# Patient Record
Sex: FEMALE | Race: WHITE | Employment: UNEMPLOYED | URBAN - METROPOLITAN AREA
[De-identification: names, ages, dates, MRNs, and addresses within clinical notes are randomized per-mention and may not be internally consistent; named-entity substitution may affect disease eponyms.]

---

## 2017-09-02 ENCOUNTER — APPOINTMENT (OUTPATIENT)
Dept: CT IMAGING | Facility: HOSPITAL | Age: 62
DRG: 039 | End: 2017-09-02
Attending: EMERGENCY MEDICINE
Payer: MEDICAID

## 2017-09-02 ENCOUNTER — APPOINTMENT (OUTPATIENT)
Dept: MRI IMAGING | Facility: HOSPITAL | Age: 62
DRG: 039 | End: 2017-09-02
Attending: Other
Payer: MEDICAID

## 2017-09-02 ENCOUNTER — APPOINTMENT (OUTPATIENT)
Dept: GENERAL RADIOLOGY | Facility: HOSPITAL | Age: 62
DRG: 039 | End: 2017-09-02
Attending: EMERGENCY MEDICINE
Payer: MEDICAID

## 2017-09-02 ENCOUNTER — HOSPITAL ENCOUNTER (INPATIENT)
Facility: HOSPITAL | Age: 62
LOS: 6 days | Discharge: HOME | DRG: 039 | End: 2017-09-08
Attending: EMERGENCY MEDICINE | Admitting: SURGERY
Payer: MEDICAID

## 2017-09-02 DIAGNOSIS — R53.1 ACUTE LEFT-SIDED WEAKNESS: Primary | ICD-10-CM

## 2017-09-02 DIAGNOSIS — I77.9 RIGHT-SIDED CAROTID ARTERY DISEASE (HCC): ICD-10-CM

## 2017-09-02 DIAGNOSIS — R47.1 DYSARTHRIA: ICD-10-CM

## 2017-09-02 PROBLEM — R73.9 HYPERGLYCEMIA: Status: ACTIVE | Noted: 2017-09-02

## 2017-09-02 LAB
ALBUMIN SERPL-MCNC: 2.8 G/DL (ref 3.5–4.8)
ALP LIVER SERPL-CCNC: 96 U/L (ref 50–130)
ALT SERPL-CCNC: 21 U/L (ref 14–54)
APTT PPP: 25 SECONDS (ref 25–34)
AST SERPL-CCNC: 20 U/L (ref 15–41)
ATRIAL RATE: 76 BPM
BASOPHILS # BLD AUTO: 0.1 X10(3) UL (ref 0–0.1)
BASOPHILS NFR BLD AUTO: 0.8 %
BILIRUB SERPL-MCNC: 0.5 MG/DL (ref 0.1–2)
BUN BLD-MCNC: 8 MG/DL (ref 8–20)
CALCIUM BLD-MCNC: 9.8 MG/DL (ref 8.3–10.3)
CHLORIDE: 108 MMOL/L (ref 101–111)
CHOLEST SMN-MCNC: 149 MG/DL (ref ?–200)
CO2: 25 MMOL/L (ref 22–32)
CREAT BLD-MCNC: 0.68 MG/DL (ref 0.55–1.02)
EOSINOPHIL # BLD AUTO: 0.17 X10(3) UL (ref 0–0.3)
EOSINOPHIL NFR BLD AUTO: 1.4 %
ERYTHROCYTE [DISTWIDTH] IN BLOOD BY AUTOMATED COUNT: 13.3 % (ref 11.5–16)
EST. AVERAGE GLUCOSE BLD GHB EST-MCNC: 128 MG/DL (ref 68–126)
GLUCOSE BLD-MCNC: 101 MG/DL (ref 65–99)
GLUCOSE BLD-MCNC: 120 MG/DL (ref 70–99)
GLUCOSE BLD-MCNC: 135 MG/DL (ref 65–99)
GLUCOSE BLD-MCNC: 94 MG/DL (ref 65–99)
HBA1C MFR BLD HPLC: 6.1 % (ref ?–5.7)
HCT VFR BLD AUTO: 46.5 % (ref 34–50)
HDLC SERPL-MCNC: 31 MG/DL (ref 45–?)
HDLC SERPL: 4.81 {RATIO} (ref ?–4.44)
HGB BLD-MCNC: 15.8 G/DL (ref 12–16)
IMMATURE GRANULOCYTE COUNT: 0.15 X10(3) UL (ref 0–1)
IMMATURE GRANULOCYTE RATIO %: 1.2 %
INR BLD: 0.96 (ref 0.89–1.11)
INR BLD: 0.99 (ref 0.89–1.11)
LDLC SERPL CALC-MCNC: 89 MG/DL (ref ?–130)
LDLC SERPL-MCNC: 29 MG/DL (ref 5–40)
LYMPHOCYTES # BLD AUTO: 2.68 X10(3) UL (ref 0.9–4)
LYMPHOCYTES NFR BLD AUTO: 21.8 %
M PROTEIN MFR SERPL ELPH: 7.3 G/DL (ref 6.1–8.3)
MCH RBC QN AUTO: 30.5 PG (ref 27–33.2)
MCHC RBC AUTO-ENTMCNC: 34 G/DL (ref 31–37)
MCV RBC AUTO: 89.8 FL (ref 81–100)
MONOCYTES # BLD AUTO: 0.63 X10(3) UL (ref 0.1–0.6)
MONOCYTES NFR BLD AUTO: 5.1 %
MORPHOLOGY: NORMAL
NEUTROPHIL ABS PRELIM: 8.59 X10 (3) UL (ref 1.3–6.7)
NEUTROPHILS # BLD AUTO: 8.59 X10(3) UL (ref 1.3–6.7)
NEUTROPHILS NFR BLD AUTO: 69.7 %
NONHDLC SERPL-MCNC: 118 MG/DL (ref ?–130)
P AXIS: 67 DEGREES
P-R INTERVAL: 164 MS
PLATELET # BLD AUTO: 360 10(3)UL (ref 150–450)
PLATELET MORPHOLOGY: NORMAL
POTASSIUM SERPL-SCNC: 4.8 MMOL/L (ref 3.6–5.1)
PSA SERPL DL<=0.01 NG/ML-MCNC: 12.8 SECONDS (ref 12–14.3)
PSA SERPL DL<=0.01 NG/ML-MCNC: 13.1 SECONDS (ref 12–14.3)
Q-T INTERVAL: 374 MS
QRS DURATION: 98 MS
QTC CALCULATION (BEZET): 420 MS
R AXIS: 64 DEGREES
RBC # BLD AUTO: 5.18 X10(6)UL (ref 3.8–5.1)
RED CELL DISTRIBUTION WIDTH-SD: 44.1 FL (ref 35.1–46.3)
SODIUM SERPL-SCNC: 140 MMOL/L (ref 136–144)
T AXIS: 71 DEGREES
TRIGLYCERIDES: 143 MG/DL (ref ?–150)
TROPONIN: <0.046 NG/ML (ref ?–0.05)
VENTRICULAR RATE: 76 BPM
WBC # BLD AUTO: 12.3 X10(3) UL (ref 4–13)

## 2017-09-02 PROCEDURE — 70450 CT HEAD/BRAIN W/O DYE: CPT | Performed by: EMERGENCY MEDICINE

## 2017-09-02 PROCEDURE — 70551 MRI BRAIN STEM W/O DYE: CPT | Performed by: OTHER

## 2017-09-02 PROCEDURE — 71010 XR CHEST AP PORTABLE  (CPT=71010): CPT | Performed by: EMERGENCY MEDICINE

## 2017-09-02 PROCEDURE — 99223 1ST HOSP IP/OBS HIGH 75: CPT | Performed by: HOSPITALIST

## 2017-09-02 PROCEDURE — 70547 MR ANGIOGRAPHY NECK W/O DYE: CPT | Performed by: OTHER

## 2017-09-02 PROCEDURE — 99223 1ST HOSP IP/OBS HIGH 75: CPT | Performed by: OTHER

## 2017-09-02 PROCEDURE — 70544 MR ANGIOGRAPHY HEAD W/O DYE: CPT | Performed by: OTHER

## 2017-09-02 RX ORDER — SODIUM PHOSPHATE, DIBASIC AND SODIUM PHOSPHATE, MONOBASIC 7; 19 G/133ML; G/133ML
1 ENEMA RECTAL ONCE AS NEEDED
Status: DISCONTINUED | OUTPATIENT
Start: 2017-09-02 | End: 2017-09-02

## 2017-09-02 RX ORDER — SODIUM PHOSPHATE, DIBASIC AND SODIUM PHOSPHATE, MONOBASIC 7; 19 G/133ML; G/133ML
1 ENEMA RECTAL ONCE AS NEEDED
Status: DISCONTINUED | OUTPATIENT
Start: 2017-09-02 | End: 2017-09-08

## 2017-09-02 RX ORDER — ASPIRIN 300 MG
300 SUPPOSITORY, RECTAL RECTAL DAILY
Status: DISCONTINUED | OUTPATIENT
Start: 2017-09-02 | End: 2017-09-05

## 2017-09-02 RX ORDER — DOCUSATE SODIUM 100 MG/1
100 CAPSULE, LIQUID FILLED ORAL 2 TIMES DAILY
Status: DISCONTINUED | OUTPATIENT
Start: 2017-09-02 | End: 2017-09-03

## 2017-09-02 RX ORDER — BISACODYL 10 MG
10 SUPPOSITORY, RECTAL RECTAL
Status: DISCONTINUED | OUTPATIENT
Start: 2017-09-02 | End: 2017-09-02

## 2017-09-02 RX ORDER — FLUTICASONE PROPIONATE 50 MCG
1 SPRAY, SUSPENSION (ML) NASAL 2 TIMES DAILY
Status: DISCONTINUED | OUTPATIENT
Start: 2017-09-02 | End: 2017-09-05

## 2017-09-02 RX ORDER — TEMAZEPAM 7.5 MG/1
7.5 CAPSULE ORAL NIGHTLY PRN
Status: DISCONTINUED | OUTPATIENT
Start: 2017-09-02 | End: 2017-09-08

## 2017-09-02 RX ORDER — POLYETHYLENE GLYCOL 3350 17 G/17G
17 POWDER, FOR SOLUTION ORAL DAILY PRN
Status: DISCONTINUED | OUTPATIENT
Start: 2017-09-02 | End: 2017-09-08

## 2017-09-02 RX ORDER — FAMOTIDINE 10 MG/ML
20 INJECTION, SOLUTION INTRAVENOUS 2 TIMES DAILY
Status: DISCONTINUED | OUTPATIENT
Start: 2017-09-02 | End: 2017-09-03

## 2017-09-02 RX ORDER — POLYETHYLENE GLYCOL 3350 17 G/17G
17 POWDER, FOR SOLUTION ORAL DAILY PRN
Status: DISCONTINUED | OUTPATIENT
Start: 2017-09-02 | End: 2017-09-02

## 2017-09-02 RX ORDER — ACETAMINOPHEN 325 MG/1
650 TABLET ORAL EVERY 4 HOURS PRN
Status: DISCONTINUED | OUTPATIENT
Start: 2017-09-02 | End: 2017-09-02

## 2017-09-02 RX ORDER — ASPIRIN 81 MG/1
324 TABLET, CHEWABLE ORAL ONCE
Status: COMPLETED | OUTPATIENT
Start: 2017-09-02 | End: 2017-09-02

## 2017-09-02 RX ORDER — MORPHINE SULFATE 4 MG/ML
1 INJECTION, SOLUTION INTRAMUSCULAR; INTRAVENOUS EVERY 2 HOUR PRN
Status: DISCONTINUED | OUTPATIENT
Start: 2017-09-02 | End: 2017-09-06

## 2017-09-02 RX ORDER — FAMOTIDINE 20 MG/1
20 TABLET ORAL 2 TIMES DAILY
Status: DISCONTINUED | OUTPATIENT
Start: 2017-09-02 | End: 2017-09-03

## 2017-09-02 RX ORDER — LABETALOL HYDROCHLORIDE 5 MG/ML
10 INJECTION, SOLUTION INTRAVENOUS EVERY 10 MIN PRN
Status: DISCONTINUED | OUTPATIENT
Start: 2017-09-02 | End: 2017-09-02

## 2017-09-02 RX ORDER — METOCLOPRAMIDE HYDROCHLORIDE 5 MG/ML
10 INJECTION INTRAMUSCULAR; INTRAVENOUS EVERY 8 HOURS PRN
Status: DISCONTINUED | OUTPATIENT
Start: 2017-09-02 | End: 2017-09-08

## 2017-09-02 RX ORDER — SENNOSIDES 8.6 MG
17.2 TABLET ORAL NIGHTLY
Status: DISCONTINUED | OUTPATIENT
Start: 2017-09-02 | End: 2017-09-03

## 2017-09-02 RX ORDER — MORPHINE SULFATE 4 MG/ML
2 INJECTION, SOLUTION INTRAMUSCULAR; INTRAVENOUS EVERY 2 HOUR PRN
Status: DISCONTINUED | OUTPATIENT
Start: 2017-09-02 | End: 2017-09-02

## 2017-09-02 RX ORDER — BISACODYL 10 MG
10 SUPPOSITORY, RECTAL RECTAL
Status: DISCONTINUED | OUTPATIENT
Start: 2017-09-02 | End: 2017-09-08

## 2017-09-02 RX ORDER — MORPHINE SULFATE 4 MG/ML
1 INJECTION, SOLUTION INTRAMUSCULAR; INTRAVENOUS EVERY 2 HOUR PRN
Status: DISCONTINUED | OUTPATIENT
Start: 2017-09-02 | End: 2017-09-02

## 2017-09-02 RX ORDER — MORPHINE SULFATE 4 MG/ML
2 INJECTION, SOLUTION INTRAMUSCULAR; INTRAVENOUS EVERY 2 HOUR PRN
Status: DISCONTINUED | OUTPATIENT
Start: 2017-09-02 | End: 2017-09-06

## 2017-09-02 RX ORDER — ACETAMINOPHEN 650 MG/1
650 SUPPOSITORY RECTAL EVERY 4 HOURS PRN
Status: DISCONTINUED | OUTPATIENT
Start: 2017-09-02 | End: 2017-09-02

## 2017-09-02 RX ORDER — DOCUSATE SODIUM 100 MG/1
100 CAPSULE, LIQUID FILLED ORAL 2 TIMES DAILY
Status: DISCONTINUED | OUTPATIENT
Start: 2017-09-02 | End: 2017-09-02

## 2017-09-02 RX ORDER — ATORVASTATIN CALCIUM 80 MG/1
80 TABLET, FILM COATED ORAL NIGHTLY
Status: DISCONTINUED | OUTPATIENT
Start: 2017-09-02 | End: 2017-09-05

## 2017-09-02 RX ORDER — METOCLOPRAMIDE HYDROCHLORIDE 5 MG/ML
10 INJECTION INTRAMUSCULAR; INTRAVENOUS EVERY 8 HOURS PRN
Status: DISCONTINUED | OUTPATIENT
Start: 2017-09-02 | End: 2017-09-02

## 2017-09-02 RX ORDER — SODIUM CHLORIDE 9 MG/ML
INJECTION, SOLUTION INTRAVENOUS CONTINUOUS
Status: ACTIVE | OUTPATIENT
Start: 2017-09-02 | End: 2017-09-04

## 2017-09-02 RX ORDER — ASPIRIN 325 MG
325 TABLET ORAL DAILY
Status: DISCONTINUED | OUTPATIENT
Start: 2017-09-02 | End: 2017-09-05

## 2017-09-02 RX ORDER — ACETAMINOPHEN 650 MG/1
650 SUPPOSITORY RECTAL EVERY 4 HOURS PRN
Status: DISCONTINUED | OUTPATIENT
Start: 2017-09-02 | End: 2017-09-08

## 2017-09-02 RX ORDER — ACETAMINOPHEN 325 MG/1
650 TABLET ORAL EVERY 4 HOURS PRN
Status: DISCONTINUED | OUTPATIENT
Start: 2017-09-02 | End: 2017-09-08

## 2017-09-02 RX ORDER — LABETALOL HYDROCHLORIDE 5 MG/ML
10 INJECTION, SOLUTION INTRAVENOUS EVERY 10 MIN PRN
Status: DISCONTINUED | OUTPATIENT
Start: 2017-09-02 | End: 2017-09-08

## 2017-09-02 RX ORDER — ONDANSETRON 2 MG/ML
4 INJECTION INTRAMUSCULAR; INTRAVENOUS EVERY 6 HOURS PRN
Status: DISCONTINUED | OUTPATIENT
Start: 2017-09-02 | End: 2017-09-02

## 2017-09-02 RX ORDER — ONDANSETRON 2 MG/ML
4 INJECTION INTRAMUSCULAR; INTRAVENOUS EVERY 6 HOURS PRN
Status: DISCONTINUED | OUTPATIENT
Start: 2017-09-02 | End: 2017-09-05

## 2017-09-02 RX ORDER — CODEINE PHOSPHATE AND GUAIFENESIN 10; 100 MG/5ML; MG/5ML
5 SOLUTION ORAL EVERY 4 HOURS PRN
Status: DISCONTINUED | OUTPATIENT
Start: 2017-09-02 | End: 2017-09-08

## 2017-09-02 RX ORDER — PHENYLEPHRINE HCL IN 0.9% NACL 50MG/250ML
PLASTIC BAG, INJECTION (ML) INTRAVENOUS CONTINUOUS PRN
Status: DISCONTINUED | OUTPATIENT
Start: 2017-09-02 | End: 2017-09-08

## 2017-09-02 NOTE — PROGRESS NOTES
CODE STROKE NOTE:    Responded to Code Stroke in ED, A9, paged at 10:41. Arrived to patient's bedside at 843.    57year old female presents to ED with c/o of tremors to Left arm and speech difficulties and HA. Onset time at 07:30.     NIHSS reporte

## 2017-09-02 NOTE — ED NOTES
Report to [facility] paged  Pt updated on room number and eta to floor. Pt denies any needs prior to transport. Assessment unchanged since last rounding.

## 2017-09-02 NOTE — SLP NOTE
ADULT SWALLOWING EVALUATION    ASSESSMENT & PLAN   ASSESSMENT  Pt seen for BSSE per stroke protocol. Per the pt, the pt has been having difficulty decoding word when reading. She stated that this has been happening for more than a few weeks.  The pt stated clear.  There are no pleural effusions.  The bones are unremarkable.        Impression     CONCLUSION:  No acute disease.           OBJECTIVE   ORAL MOTOR EXAMINATION  Dentition: Functional  Symmetry: Within Functional Limits  Strength:  Within Functional L

## 2017-09-02 NOTE — ED NOTES
Pt back in room. Stroke coordinator at bedside. Speech remains delayed. Pt c/o mild headache. Sts home life has been very stressful. No involuntary movements to extremities noted.      Ready for XR

## 2017-09-02 NOTE — ED PROVIDER NOTES
Patient Seen in: [facility] Emergency Department    History   Patient presents with:  Stroke (neurologic)    Stated Complaint: Stroke symptoms    HPI    Patient presents with acute weakness and difficulties speaking.   She initially noted her cyst sy LUMBAR / TRANSFORAMINAL EPIDURAL                STEROID INJECTION;  Surgeon: Popeye Ambrose DO;  Location: [address] [place] [address]  No date: OTHER      Comment: FINGER AMPUTATION--ACCIDENTAL  No date: REMOVAL GALLBLADDER    Medications : Cardiovascular: Normal rate, regular rhythm, normal heart sounds and intact distal pulses. No murmur heard. Pulmonary/Chest: Effort normal and breath sounds normal. No respiratory distress. Abdominal: Soft.  Bowel sounds are normal. There is no tend DIFFERENTIAL[[id number]]          Abnormal            Final result                 Please view results for these tests on the individual orders.    SCAN SLIDE   URINALYSIS WITH CULTURE REFLEX   RAINBOW DRAW BLUE   RAINBOW DRAW GOLD   RAINBOW DRAW LAVENDER

## 2017-09-02 NOTE — ED NOTES
Round on pt. Speech improving. Delayed responses are more timely. Pt sts she feels her speech is better as well.

## 2017-09-02 NOTE — CONSULTS
[facility]  Report of Consultation    Tita Dowling Patient Status:  Inpatient    3/2/1955 MRN [id number]   [facility] 7NE-A Attending Antoine Krishnamurthy MD   Hosp Day # 0 PCP Dieudonne Caldwell     Reason for Consultation:      History of P blood pressure    • Impaired fasting blood sugar      Past Surgical History:  No date: CHOLECYSTECTOMY      Comment: 10/1/11, ERCP with stone extraction  11/10/2015: COLONOSCOPY,BIOPSY N/A      Comment: Procedure: COLONOSCOPY, POSSIBLE BIOPSY, 2 drops q2 hours x 24 hours then bid x 6 days Disp: 5 mL Rfl: 0 Unknown at Unknown time   HYDROcodone-acetaminophen (NORCO) 5-325 MG Oral Tab Take 1 tablet by mouth every 6 (six) hours as needed for Pain.  Disp:  Rfl:  Unknown at Unknown time         Jess 143/60 98.7 °F (37.1 °C) Oral 71 18 97 % - -   09/02/17 1211 146/94 - - 77 16 97 % - -   09/02/17 1135 112/52 - - 85 16 97 % - -   09/02/17 1104 152/70 - - 81 16 97 % - -   09/02/17 1035 (!) 162/70 98.6 °F (37 °C) Temporal 79 18 96 % 69\" 195 lb       Gen: Imaging:  Xr Chest Ap Portable  (cpt=71010)    Result Date: 9/2/2017  CONCLUSION:  No acute disease.      Dictated by: Ciera Hernandez MD on 9/02/2017 at 11:21     Approved by: Ciera Hernandez MD            Ct Stroke Brain (no Iv)(cpt=70450)

## 2017-09-02 NOTE — ED INITIAL ASSESSMENT (HPI)
Pt to ED with c/o involuntary movements and the left side with delayed speech that started at 0730. Speech deficit remains, No involuntary movements noted.

## 2017-09-02 NOTE — PLAN OF CARE
Received from ER awake alert flat affect, somewhat withdrawn caudal headache 4-5/10 constant. States she has been combating a cold for about one week, with significant coughing at bedtime.   She became agitated with the depression screening and did not wan

## 2017-09-02 NOTE — PROGRESS NOTES
09/02/17 1706   Clinical Encounter Type   Visited With Patient   Routine Visit Introduction   Continue Visiting No   Patient's Supportive Strategies/Resources  provided emotional support, including active listening and acknowledgment of concerns

## 2017-09-02 NOTE — H&P
[facility]  History and Physical     Mark Espana Patient Status:  Inpatient    3/2/1955 MRN [id number]   [facility] 7NE-A Attending Manfred Gottron, MD   Hosp Day # 0 PCP Lida Garcia     Chief Complaint: L side weakness    Hi Location: [address] [place] [address]  11/19/2015: INJECTION, W/WO CONTRAST, DX/THERAPEUTIC SUBST* N/A      Comment: Procedure: LUMBAR / TRANSFORAMINAL EPIDURAL                STEROID INJECTION;  Surgeon: Cottie Nissen, DO;  Location: [facility]. Respiratory: Clear to auscultation bilaterally. No wheezes. No rhonchi. Cardiovascular: S1, S2. Regular rate and rhythm. No murmurs, rubs or gallops. Equal pulses. Chest and Back: No tenderness or deformity.   Abdomen: Soft, nontender, nondisten

## 2017-09-03 ENCOUNTER — APPOINTMENT (OUTPATIENT)
Dept: CV DIAGNOSTICS | Facility: HOSPITAL | Age: 62
DRG: 039 | End: 2017-09-03
Attending: Other
Payer: MEDICAID

## 2017-09-03 ENCOUNTER — APPOINTMENT (OUTPATIENT)
Dept: ULTRASOUND IMAGING | Facility: HOSPITAL | Age: 62
DRG: 039 | End: 2017-09-03
Attending: HOSPITALIST
Payer: MEDICAID

## 2017-09-03 PROBLEM — R47.1 DYSARTHRIA: Status: RESOLVED | Noted: 2017-09-02 | Resolved: 2017-09-03

## 2017-09-03 LAB
BASOPHILS # BLD AUTO: 0.1 X10(3) UL (ref 0–0.1)
BASOPHILS NFR BLD AUTO: 0.8 %
BILIRUB UR QL STRIP.AUTO: NEGATIVE
BUN BLD-MCNC: 8 MG/DL (ref 8–20)
CALCIUM BLD-MCNC: 9.7 MG/DL (ref 8.3–10.3)
CHLORIDE: 110 MMOL/L (ref 101–111)
CHOLEST SMN-MCNC: 160 MG/DL (ref ?–200)
CLARITY UR REFRACT.AUTO: CLEAR
CO2: 27 MMOL/L (ref 22–32)
COLOR UR AUTO: YELLOW
CREAT BLD-MCNC: 0.59 MG/DL (ref 0.55–1.02)
EOSINOPHIL # BLD AUTO: 0.26 X10(3) UL (ref 0–0.3)
EOSINOPHIL NFR BLD AUTO: 2.2 %
ERYTHROCYTE [DISTWIDTH] IN BLOOD BY AUTOMATED COUNT: 13.3 % (ref 11.5–16)
GLUCOSE BLD-MCNC: 113 MG/DL (ref 65–99)
GLUCOSE BLD-MCNC: 115 MG/DL (ref 65–99)
GLUCOSE BLD-MCNC: 90 MG/DL (ref 65–99)
GLUCOSE BLD-MCNC: 95 MG/DL (ref 70–99)
GLUCOSE UR STRIP.AUTO-MCNC: NEGATIVE MG/DL
HCT VFR BLD AUTO: 46.4 % (ref 34–50)
HDLC SERPL-MCNC: 30 MG/DL (ref 45–?)
HDLC SERPL: 5.33 {RATIO} (ref ?–4.44)
HGB BLD-MCNC: 15.5 G/DL (ref 12–16)
IMMATURE GRANULOCYTE COUNT: 0.09 X10(3) UL (ref 0–1)
IMMATURE GRANULOCYTE RATIO %: 0.7 %
KETONES UR STRIP.AUTO-MCNC: NEGATIVE MG/DL
LDLC SERPL CALC-MCNC: 102 MG/DL (ref ?–130)
LDLC SERPL-MCNC: 28 MG/DL (ref 5–40)
LEUKOCYTE ESTERASE UR QL STRIP.AUTO: NEGATIVE
LYMPHOCYTES # BLD AUTO: 2.4 X10(3) UL (ref 0.9–4)
LYMPHOCYTES NFR BLD AUTO: 19.9 %
MCH RBC QN AUTO: 29.8 PG (ref 27–33.2)
MCHC RBC AUTO-ENTMCNC: 33.4 G/DL (ref 31–37)
MCV RBC AUTO: 89.1 FL (ref 81–100)
MONOCYTES # BLD AUTO: 0.63 X10(3) UL (ref 0.1–0.6)
MONOCYTES NFR BLD AUTO: 5.2 %
NEUTROPHIL ABS PRELIM: 8.58 X10 (3) UL (ref 1.3–6.7)
NEUTROPHILS # BLD AUTO: 8.58 X10(3) UL (ref 1.3–6.7)
NEUTROPHILS NFR BLD AUTO: 71.2 %
NITRITE UR QL STRIP.AUTO: NEGATIVE
NONHDLC SERPL-MCNC: 130 MG/DL (ref ?–130)
PH UR STRIP.AUTO: 6 [PH] (ref 4.5–8)
PLATELET # BLD AUTO: 348 10(3)UL (ref 150–450)
POTASSIUM SERPL-SCNC: 4.1 MMOL/L (ref 3.6–5.1)
PROT UR STRIP.AUTO-MCNC: NEGATIVE MG/DL
RBC # BLD AUTO: 5.21 X10(6)UL (ref 3.8–5.1)
RBC UR QL AUTO: NEGATIVE
RED CELL DISTRIBUTION WIDTH-SD: 43.8 FL (ref 35.1–46.3)
SODIUM SERPL-SCNC: 144 MMOL/L (ref 136–144)
SP GR UR STRIP.AUTO: 1.01 (ref 1–1.03)
TRIGLYCERIDES: 141 MG/DL (ref ?–150)
TSI SER-ACNC: 0.97 MIU/ML (ref 0.35–5.5)
UROBILINOGEN UR STRIP.AUTO-MCNC: <2 MG/DL
WBC # BLD AUTO: 12.1 X10(3) UL (ref 4–13)

## 2017-09-03 PROCEDURE — 99233 SBSQ HOSP IP/OBS HIGH 50: CPT | Performed by: OTHER

## 2017-09-03 PROCEDURE — 95819 EEG AWAKE AND ASLEEP: CPT | Performed by: OTHER

## 2017-09-03 PROCEDURE — 93306 TTE W/DOPPLER COMPLETE: CPT | Performed by: OTHER

## 2017-09-03 PROCEDURE — 93880 EXTRACRANIAL BILAT STUDY: CPT | Performed by: HOSPITALIST

## 2017-09-03 PROCEDURE — 99232 SBSQ HOSP IP/OBS MODERATE 35: CPT | Performed by: HOSPITALIST

## 2017-09-03 RX ORDER — NICOTINE 21 MG/24HR
1 PATCH, TRANSDERMAL 24 HOURS TRANSDERMAL DAILY
Status: DISCONTINUED | OUTPATIENT
Start: 2017-09-03 | End: 2017-09-08

## 2017-09-03 RX ORDER — ATORVASTATIN CALCIUM 80 MG/1
40 TABLET, FILM COATED ORAL NIGHTLY
Qty: 30 TABLET | Refills: 0 | Status: SHIPPED | OUTPATIENT
Start: 2017-09-03 | End: 2017-09-08

## 2017-09-03 RX ORDER — SENNOSIDES 8.6 MG
17.2 TABLET ORAL DAILY PRN
Status: DISCONTINUED | OUTPATIENT
Start: 2017-09-03 | End: 2017-09-08

## 2017-09-03 NOTE — PROCEDURES
[address] EEG report    Name: Matthew Aguayo    Date of Study: 9/3/2017      Routine EEG Report    Ordering Physician: Saige Mancia MD                             Primary Care Physician: Barney Frankel    Technical Aspects: aspirin 300 MG rectal suppository 300 mg 300 mg Rectal Daily   Or      aspirin tab 325 mg 325 mg Oral Daily         57year old female being tested because of episodes of left side weakness, intermittent olfactory auras and metallic tastes.      Interpret

## 2017-09-03 NOTE — OCCUPATIONAL THERAPY NOTE
OCCUPATIONAL THERAPY QUICK EVALUATION - INPATIENT    Room Number: 6377/6393-I  Evaluation Date: 9/3/2017     Type of Evaluation: Quick Eval  Presenting Problem: L sided weakness    Physician Order: IP Consult to Occupational Therapy  Reason for Therapy:  A Impaired fasting blood sugar        Past Surgical History  Past Surgical History:  No date: CHOLECYSTECTOMY      Comment: 10/1/11, ERCP with stone extraction  11/10/2015: COLONOSCOPY,BIOPSY N/A      Comment: Procedure: COLONOSCOPY, POSSIBLE BIOPSY, RESTRICTION  Weight Bearing Restriction: None                PAIN ASSESSMENT  Ratin  Location: n/a       COGNITION  WFL    RANGE OF MOTION AND STRENGTH ASSESSMENT  Upper extremity ROM is within functional limits     Upper extremity strength is within f Will d/c from OT services at this time as all goals have been met. Pt reporting of possible plan for stenting of R carotid artery. Will require new orders to evaluate post procedure.  Thank you       OT Discharge Recommendations: Home  OT Device Recommendat

## 2017-09-03 NOTE — PLAN OF CARE
NEUROLOGICAL - ADULT    • Achieves maximal functionality and self care Progressing            Received report at 0700. Patient alert and oriented x4. No complaints of pain. Neuro checks q4 hours. See flowsheets.  US carotid completed and showed >70% stenosi

## 2017-09-03 NOTE — PROGRESS NOTES
Dr. Clyde Fleischer put on consult due to results from [address] carotid. Dr. Clyde Fleischer paged, covering physician called back saying someone will see her.

## 2017-09-03 NOTE — PROGRESS NOTES
[facility]  Progress Note     Scott Santos Patient Status:  Inpatient    3/2/1955 MRN [id number]   [facility] 7NE-A Attending Nakul Gibson MD   Hosp Day # 1 PCP Fernandez Duran     Chief Complaint: L sided weakness     S: QTQTAW Rectal Daily    Or   • aspirin  325 mg Oral Daily   • famoTIDine  20 mg Oral BID    Or   • famoTIDine  20 mg Intravenous BID       ASSESSMENT / PLAN:     1. L sided weakness, difficulty speaking, intermittent confusion, headache.  MRI w/o stroke, MRA with c

## 2017-09-03 NOTE — PHYSICAL THERAPY NOTE
PHYSICAL THERAPY QUICK EVALUATION - INPATIENT    Room Number: 0069/2872-C  Evaluation Date: 9/3/2017  Presenting Problem: L side weakness; delirium  Physician Order: PT Eval and Treat     Hx:  Pt is 57year old female admitted on 9/2/2017 from home with c/ LOCLZJ NDL/CATH SPI DX/THER AYQ N/A      Comment: Procedure: LUMBAR / TRANSFORAMINAL EPIDURAL                STEROID INJECTION;  Surgeon: Matthew Ybarra DO;  Location: [address] [place] [address]  11/19/2015: INJECTION, W/WO CONTRAST, DX/THE (including adjusting bedclothes, sheets and blankets)?: None   -   Sitting down on and standing up from a chair with arms (e.g., wheelchair, bedside commode, etc.): None   -   Moving from lying on back to sitting on the side of the bed?: None   How much he pt has been able to perform ADLs (I) for a number of years. Presently pt does not require skilled PT, but if pt undergoes a procedure PT can be reordered and pt re evaluated. Overall complexity of evaluation is low as patient's status is stable.       P

## 2017-09-03 NOTE — CM/SW NOTE
09/03/17 1000   CM/SW Referral Data   Referral Source Physician   Reason for Referral Discharge planning   Informant Patient   Pertinent Medical Hx   Primary Care Physician Name dr Jonnie Torrez Drug/Alcohol Use n   Major Change

## 2017-09-03 NOTE — PLAN OF CARE
Assumed care at 1900. Pt a/ox4. Neuro wnl. Vss, nsr per tele, RA. Denies pain. Mri completed. Echo and eeg today.   Impaired Swallowing    • Minimize aspiration risk Progressing        NEUROLOGICAL - ADULT    • Achieves maximal functionality and self ca

## 2017-09-03 NOTE — PAYOR COMM NOTE
--------------  ADMISSION REVIEW       9/1    ED    History   Patient presents with:  Stroke (neurologic)     Stated Complaint: Stroke symptoms     HPI     Patient presents with acute weakness and difficulties speaking.   She initially noted her cyst sympto Notable for the following:        Result Value      Glucose 120 (*)       Albumin 2.8 (*)       All other components within normal limits   RBC MORPHOLOGY SCAN - Abnormal; Notable for the following:      Reactive Lymphs Small (*)       All other components PROTOCOL

## 2017-09-03 NOTE — PROGRESS NOTES
[id number] [facility] Neurology Progress Note    Sunshine Mcmahon Patient Status:  Inpatient    3/2/1955 MRN [id number]   [facility] 7NE-A Attending Jenny Del Toro MD   [facility] Day # 1 PCP Danica Perales     Subjective:  Sunshine Mcmahon is a(n) proximal right internal carotid with greater than 70% stenosis by both visual and spectral analysis. Less than 50% carotid stenosis on the left based on visual and spectral analysis. Bilateral lymph nodes in the neck incidentally noted measuring 2.3 x 1. compromising interpretation; within the interpretable portion, no definitive epileptiform activity was noted.         Of note, a normal routine EEG would not rule out seizures and clinical correlation is advised.               Assessment:    TIA:  Symptoms

## 2017-09-04 ENCOUNTER — APPOINTMENT (OUTPATIENT)
Dept: CT IMAGING | Facility: HOSPITAL | Age: 62
DRG: 039 | End: 2017-09-04
Attending: THORACIC SURGERY (CARDIOTHORACIC VASCULAR SURGERY)
Payer: MEDICAID

## 2017-09-04 LAB
ANTIBODY SCREEN: NEGATIVE
GLUCOSE BLD-MCNC: 106 MG/DL (ref 65–99)
GLUCOSE BLD-MCNC: 106 MG/DL (ref 65–99)
GLUCOSE BLD-MCNC: 107 MG/DL (ref 65–99)
GLUCOSE BLD-MCNC: 121 MG/DL (ref 65–99)
RH BLOOD TYPE: POSITIVE

## 2017-09-04 PROCEDURE — 99232 SBSQ HOSP IP/OBS MODERATE 35: CPT | Performed by: HOSPITALIST

## 2017-09-04 PROCEDURE — 99233 SBSQ HOSP IP/OBS HIGH 50: CPT | Performed by: OTHER

## 2017-09-04 PROCEDURE — 70498 CT ANGIOGRAPHY NECK: CPT | Performed by: THORACIC SURGERY (CARDIOTHORACIC VASCULAR SURGERY)

## 2017-09-04 RX ORDER — ENOXAPARIN SODIUM 100 MG/ML
40 INJECTION SUBCUTANEOUS DAILY
Status: DISCONTINUED | OUTPATIENT
Start: 2017-09-04 | End: 2017-09-04

## 2017-09-04 NOTE — PROGRESS NOTES
Follow up CTA of neck to further evaluate right internal carotid stenosis. Dr Doris Sims to see pt and review results    D/W Dr Suggs/ Franck Shabazz.  PEE Almendarez

## 2017-09-04 NOTE — PLAN OF CARE
Impaired Activities of Daily Living    • Achieve highest/safest level of independence in self care Progressing        Impaired Swallowing    • Minimize aspiration risk Progressing        NEUROLOGICAL - ADULT    • Achieves maximal functionality and self car

## 2017-09-04 NOTE — PROGRESS NOTES
[facility]  Progress Note     Marc Ortega Patient Status:  Inpatient    3/2/1955 MRN [id number]   [facility] 7NE-A Attending Bobby Choudhury MD   Hosp Day # 2 PCP Kit Cruz     Chief Complaint: L sided weakness     S: MRMRMN Fluticasone Propionate  1 spray Each Nare BID   • atorvastatin  80 mg Oral Nightly   • aspirin  300 mg Rectal Daily    Or   • aspirin  325 mg Oral Daily       ASSESSMENT / PLAN:     1. L sided weakness, difficulty speaking, intermittent confusion, headache

## 2017-09-04 NOTE — PROGRESS NOTES
Assumed pt care at 0730  Pt a & o x 4 vss on room air nsr on tele neuro checks q4h no deficits   Pt states that she only notices problems with her speech when she is reading. She will look at the word and not be able to say it.  No word finding issues when

## 2017-09-04 NOTE — PROGRESS NOTES
[facility]  Neurology  Notes  Affiliated with [facility]  2017, 10:18 AM     [facility] Patient Status:  Inpatient    3/2/1955 MRN [id number]   [facility] 7NE-A Attending Thony Ayala MD full, no ptosis or nystagmus, VF intact, Face symmetric, sensation intact, hearing fine, tongue midline  No motor and sensory findings  DTRs symmetric.   No upper motor neuron signs  Cerebellar, coordination were normal  Gait normal          Assessment & Jessica Huynh

## 2017-09-05 ENCOUNTER — SURGERY (OUTPATIENT)
Age: 62
End: 2017-09-05

## 2017-09-05 LAB
BUN BLD-MCNC: 13 MG/DL (ref 8–20)
CALCIUM BLD-MCNC: 8.9 MG/DL (ref 8.3–10.3)
CHLORIDE: 111 MMOL/L (ref 101–111)
CO2: 25 MMOL/L (ref 22–32)
CREAT BLD-MCNC: 0.64 MG/DL (ref 0.55–1.02)
ERYTHROCYTE [DISTWIDTH] IN BLOOD BY AUTOMATED COUNT: 13.4 % (ref 11.5–16)
GLUCOSE BLD-MCNC: 103 MG/DL (ref 65–99)
GLUCOSE BLD-MCNC: 106 MG/DL (ref 70–99)
GLUCOSE BLD-MCNC: 90 MG/DL (ref 65–99)
GLUCOSE BLD-MCNC: 95 MG/DL (ref 65–99)
GLUCOSE BLD-MCNC: 99 MG/DL (ref 65–99)
HCT VFR BLD AUTO: 43.1 % (ref 34–50)
HGB BLD-MCNC: 14.8 G/DL (ref 12–16)
ISTAT ACTIVATED CLOTTING TIME: 131 SECONDS (ref 74–137)
ISTAT BLOOD GAS BASE EXCESS: 5 MMOL/L
ISTAT BLOOD GAS HCO3: 30.1 MEQ/L (ref 22–26)
ISTAT BLOOD GAS O2 SATURATION: 100 % (ref 92–100)
ISTAT BLOOD GAS PCO2: 52 MMHG (ref 35–45)
ISTAT BLOOD GAS PH: 7.37 (ref 7.35–7.45)
ISTAT BLOOD GAS PO2: 517 MMHG (ref 80–105)
ISTAT BLOOD GAS TCO2: 32 MMOL/L (ref 22–32)
ISTAT HEMATOCRIT: 45 % (ref 37–54)
ISTAT IONIZED CALCIUM: 1.34 MMOL/L (ref 1.12–1.32)
ISTAT POTASSIUM: 4.1 MMOL/L (ref 3.6–5.1)
ISTAT SODIUM: 139 MMOL/L (ref 136–144)
MCH RBC QN AUTO: 30.8 PG (ref 27–33.2)
MCHC RBC AUTO-ENTMCNC: 34.3 G/DL (ref 31–37)
MCV RBC AUTO: 89.6 FL (ref 81–100)
PLATELET # BLD AUTO: 328 10(3)UL (ref 150–450)
POTASSIUM SERPL-SCNC: 3.8 MMOL/L (ref 3.6–5.1)
RBC # BLD AUTO: 4.81 X10(6)UL (ref 3.8–5.1)
RED CELL DISTRIBUTION WIDTH-SD: 43.9 FL (ref 35.1–46.3)
SODIUM SERPL-SCNC: 143 MMOL/L (ref 136–144)
WBC # BLD AUTO: 16 X10(3) UL (ref 4–13)

## 2017-09-05 PROCEDURE — 03UH07Z SUPPLEMENT RIGHT COMMON CAROTID ARTERY WITH AUTOLOGOUS TISSUE SUBSTITUTE, OPEN APPROACH: ICD-10-PCS | Performed by: SURGERY

## 2017-09-05 PROCEDURE — 03CH0Z6: ICD-10-PCS | Performed by: SURGERY

## 2017-09-05 PROCEDURE — 99233 SBSQ HOSP IP/OBS HIGH 50: CPT | Performed by: OTHER

## 2017-09-05 PROCEDURE — 06BP0ZZ EXCISION OF RIGHT SAPHENOUS VEIN, OPEN APPROACH: ICD-10-PCS | Performed by: SURGERY

## 2017-09-05 PROCEDURE — 99232 SBSQ HOSP IP/OBS MODERATE 35: CPT | Performed by: HOSPITALIST

## 2017-09-05 PROCEDURE — 03CH0ZZ EXTIRPATION OF MATTER FROM RIGHT COMMON CAROTID ARTERY, OPEN APPROACH: ICD-10-PCS | Performed by: SURGERY

## 2017-09-05 RX ORDER — ONDANSETRON 2 MG/ML
4 INJECTION INTRAMUSCULAR; INTRAVENOUS EVERY 6 HOURS PRN
Status: DISCONTINUED | OUTPATIENT
Start: 2017-09-05 | End: 2017-09-08

## 2017-09-05 RX ORDER — NALOXONE HYDROCHLORIDE 0.4 MG/ML
80 INJECTION, SOLUTION INTRAMUSCULAR; INTRAVENOUS; SUBCUTANEOUS AS NEEDED
Status: ACTIVE | OUTPATIENT
Start: 2017-09-05 | End: 2017-09-05

## 2017-09-05 RX ORDER — HYDROCODONE BITARTRATE AND ACETAMINOPHEN 5; 325 MG/1; MG/1
2 TABLET ORAL EVERY 4 HOURS PRN
Status: DISCONTINUED | OUTPATIENT
Start: 2017-09-05 | End: 2017-09-08

## 2017-09-05 RX ORDER — HYDROCODONE BITARTRATE AND ACETAMINOPHEN 5; 325 MG/1; MG/1
1 TABLET ORAL EVERY 4 HOURS PRN
Status: DISCONTINUED | OUTPATIENT
Start: 2017-09-05 | End: 2017-09-08

## 2017-09-05 RX ORDER — ATORVASTATIN CALCIUM 20 MG/1
20 TABLET, FILM COATED ORAL NIGHTLY
Status: DISCONTINUED | OUTPATIENT
Start: 2017-09-05 | End: 2017-09-07

## 2017-09-05 RX ORDER — MORPHINE SULFATE 2 MG/ML
1 INJECTION, SOLUTION INTRAMUSCULAR; INTRAVENOUS EVERY 2 HOUR PRN
Status: DISCONTINUED | OUTPATIENT
Start: 2017-09-05 | End: 2017-09-08

## 2017-09-05 RX ORDER — HYDROCODONE BITARTRATE AND ACETAMINOPHEN 5; 325 MG/1; MG/1
2 TABLET ORAL AS NEEDED
Status: COMPLETED | OUTPATIENT
Start: 2017-09-05 | End: 2017-09-06

## 2017-09-05 RX ORDER — ACETAMINOPHEN 325 MG/1
650 TABLET ORAL EVERY 4 HOURS PRN
Status: DISCONTINUED | OUTPATIENT
Start: 2017-09-05 | End: 2017-09-08

## 2017-09-05 RX ORDER — HYDROMORPHONE HYDROCHLORIDE 1 MG/ML
0.4 INJECTION, SOLUTION INTRAMUSCULAR; INTRAVENOUS; SUBCUTANEOUS EVERY 5 MIN PRN
Status: DISPENSED | OUTPATIENT
Start: 2017-09-05 | End: 2017-09-05

## 2017-09-05 RX ORDER — CLOPIDOGREL BISULFATE 75 MG/1
75 TABLET ORAL DAILY
Status: DISCONTINUED | OUTPATIENT
Start: 2017-09-05 | End: 2017-09-08

## 2017-09-05 RX ORDER — CEFAZOLIN SODIUM 1 G/3ML
INJECTION, POWDER, FOR SOLUTION INTRAMUSCULAR; INTRAVENOUS
Status: DISCONTINUED | OUTPATIENT
Start: 2017-09-05 | End: 2017-09-05 | Stop reason: HOSPADM

## 2017-09-05 RX ORDER — MORPHINE SULFATE 2 MG/ML
2 INJECTION, SOLUTION INTRAMUSCULAR; INTRAVENOUS EVERY 2 HOUR PRN
Status: DISCONTINUED | OUTPATIENT
Start: 2017-09-05 | End: 2017-09-08

## 2017-09-05 RX ORDER — NITROGLYCERIN 20 MG/100ML
INJECTION INTRAVENOUS CONTINUOUS
Status: DISCONTINUED | OUTPATIENT
Start: 2017-09-05 | End: 2017-09-08

## 2017-09-05 RX ORDER — SODIUM CHLORIDE, SODIUM LACTATE, POTASSIUM CHLORIDE, CALCIUM CHLORIDE 600; 310; 30; 20 MG/100ML; MG/100ML; MG/100ML; MG/100ML
INJECTION, SOLUTION INTRAVENOUS CONTINUOUS
Status: DISCONTINUED | OUTPATIENT
Start: 2017-09-05 | End: 2017-09-08

## 2017-09-05 RX ORDER — LIDOCAINE HYDROCHLORIDE 10 MG/ML
INJECTION, SOLUTION INFILTRATION; PERINEURAL AS NEEDED
Status: DISCONTINUED | OUTPATIENT
Start: 2017-09-05 | End: 2017-09-05 | Stop reason: HOSPADM

## 2017-09-05 RX ORDER — BUPIVACAINE HYDROCHLORIDE 5 MG/ML
INJECTION, SOLUTION EPIDURAL; INTRACAUDAL AS NEEDED
Status: DISCONTINUED | OUTPATIENT
Start: 2017-09-05 | End: 2017-09-05 | Stop reason: HOSPADM

## 2017-09-05 RX ORDER — ASPIRIN 81 MG/1
81 TABLET ORAL DAILY
Status: DISCONTINUED | OUTPATIENT
Start: 2017-09-06 | End: 2017-09-08

## 2017-09-05 RX ORDER — SODIUM CHLORIDE, SODIUM LACTATE, POTASSIUM CHLORIDE, CALCIUM CHLORIDE 600; 310; 30; 20 MG/100ML; MG/100ML; MG/100ML; MG/100ML
INJECTION, SOLUTION INTRAVENOUS CONTINUOUS
Status: DISCONTINUED | OUTPATIENT
Start: 2017-09-05 | End: 2017-09-05

## 2017-09-05 RX ORDER — ONDANSETRON 2 MG/ML
4 INJECTION INTRAMUSCULAR; INTRAVENOUS AS NEEDED
Status: ACTIVE | OUTPATIENT
Start: 2017-09-05 | End: 2017-09-05

## 2017-09-05 RX ORDER — HYDROCODONE BITARTRATE AND ACETAMINOPHEN 5; 325 MG/1; MG/1
1 TABLET ORAL AS NEEDED
Status: COMPLETED | OUTPATIENT
Start: 2017-09-05 | End: 2017-09-06

## 2017-09-05 RX ORDER — MORPHINE SULFATE 4 MG/ML
4 INJECTION, SOLUTION INTRAMUSCULAR; INTRAVENOUS EVERY 2 HOUR PRN
Status: DISCONTINUED | OUTPATIENT
Start: 2017-09-05 | End: 2017-09-08

## 2017-09-05 NOTE — CONSULTS
[facility]    PATIENT'S NAME: [email or web address], [facility]   ATTENDING PHYSICIAN: Jose Spring MD   CONSULTING PHYSICIAN: Jono Miller M.D.    PATIENT ACCOUNT#:   [de-identified]    LOCATION:  [address] [place] [address]  MEDICAL RECORD #:   [id number]       DATE OF BIRTH 30-pound weight loss she says related to stress. The patient denies any abdominal pain. Denies any anorexia. Denies pain related with eating. PHYSICAL EXAMINATION:    GENERAL:  The patient is awake, alert. She is appropriate.   Currently in no acute patient understands and agreed. Discussed the surgery, postoperative recovery time, and a followup. All questions were answered.       Dictated By Mark Keen M.D.  d: 09/04/2017 17:09:21  t: 09/04/2017 18:04:08  Job [id number]  JJW/    cc:

## 2017-09-05 NOTE — PROGRESS NOTES
[facility] Cardiology Progress Note    Subjective:  A bit nervous about surgery, no further L sided weakness.       Objective:  /68 (BP Location: Left leg)   Pulse 76   Temp 97.7 °F (36.5 °C) (Oral)   Resp 18   Ht 175.3 cm (5' 9\")   Wt 195 lb (88.5 kg)

## 2017-09-05 NOTE — PLAN OF CARE
Assumed care of pt at 1900. No acute events over night. A&Ox4, VSS on RA. NSR on tele. No neuro deficits. Tylenol given for a headache. Plan for right carotid endarterectory this afternoon with Dr. Arnaldo Hernandez. NPO. Needs attended to.      Impaired Activities of

## 2017-09-05 NOTE — SLP NOTE
Attempted to see pt for speech therapy services. Pt off the floor for pending procedure. Will follow up as scheduling permits. Thank you.     Katy Foster M.S. [address] [facility] [place]  Pager [phone]

## 2017-09-05 NOTE — PROGRESS NOTES
[address] [facility] Neurology Progress Note    Karina Duarte Patient Status:  Inpatient    3/2/1955 MRN [id number]   [facility] 7NE-A Attending Shivam Ortiz MD   Hosp Day # 3 PCP Erminia Lesch     Chief Complaint:   Follow up for left 4049  9/5/2017, 11:26 AM      NEUROLOGY Attending notes:    Above reviewed and patient independently seen and examined.          S/O:   No recurrent weakness or other focal left sided symptoms    During today's visit, the following items were reviewed: radi

## 2017-09-05 NOTE — PROGRESS NOTES
[facility]  Progress Note     Marcelline Dakin Patient Status:  Inpatient    3/2/1955 MRN [id number]   [facility] 7NE-A Attending Duane Jacobo MD   Hosp Day # 3 PCP Governor Domenica     Chief Complaint: L sided weakness     S: MVKYKV atorvastatin  80 mg Oral Nightly   • aspirin  300 mg Rectal Daily    Or   • aspirin  325 mg Oral Daily       ASSESSMENT / PLAN:     1. L sided weakness, difficulty speaking, intermittent confusion, headache.  MRI w/o stroke, found to have R sided carotid st

## 2017-09-06 LAB
ATRIAL RATE: 85 BPM
BUN BLD-MCNC: 14 MG/DL (ref 8–20)
CALCIUM BLD-MCNC: 9 MG/DL (ref 8.3–10.3)
CHLORIDE: 107 MMOL/L (ref 101–111)
CO2: 26 MMOL/L (ref 22–32)
CREAT BLD-MCNC: 0.53 MG/DL (ref 0.55–1.02)
ERYTHROCYTE [DISTWIDTH] IN BLOOD BY AUTOMATED COUNT: 13.3 % (ref 11.5–16)
GLUCOSE BLD-MCNC: 93 MG/DL (ref 70–99)
HAV IGM SER QL: 2 MG/DL (ref 1.7–3)
HCT VFR BLD AUTO: 38.9 % (ref 34–50)
HGB BLD-MCNC: 13.2 G/DL (ref 12–16)
MCH RBC QN AUTO: 30.4 PG (ref 27–33.2)
MCHC RBC AUTO-ENTMCNC: 33.9 G/DL (ref 31–37)
MCV RBC AUTO: 89.6 FL (ref 81–100)
P AXIS: 64 DEGREES
P-R INTERVAL: 160 MS
PHOSPHATE SERPL-MCNC: 3.3 MG/DL (ref 2.5–4.9)
PLATELET # BLD AUTO: 304 10(3)UL (ref 150–450)
POTASSIUM SERPL-SCNC: 4.1 MMOL/L (ref 3.6–5.1)
Q-T INTERVAL: 374 MS
QRS DURATION: 92 MS
QTC CALCULATION (BEZET): 445 MS
R AXIS: 62 DEGREES
RBC # BLD AUTO: 4.34 X10(6)UL (ref 3.8–5.1)
RED CELL DISTRIBUTION WIDTH-SD: 43.6 FL (ref 35.1–46.3)
SODIUM SERPL-SCNC: 140 MMOL/L (ref 136–144)
T AXIS: 74 DEGREES
VENTRICULAR RATE: 85 BPM
WBC # BLD AUTO: 12.4 X10(3) UL (ref 4–13)

## 2017-09-06 PROCEDURE — 99233 SBSQ HOSP IP/OBS HIGH 50: CPT | Performed by: OTHER

## 2017-09-06 PROCEDURE — 99232 SBSQ HOSP IP/OBS MODERATE 35: CPT | Performed by: HOSPITALIST

## 2017-09-06 NOTE — PROGRESS NOTES
[facility] [address]  Neurology  Notes  Affiliated with [facility]  2017, 9:34 AM     Leonela Brday Patient Status:  Inpatient    3/2/1955 MRN [id number]   [facility] 6NE-A Attending David Colin MD   [facility] 16.0*  12.4   PLT  328.0  304.0       Recent Labs   Lab  09/05/17   1729  09/06/17   0440   GLU  106*  93   BUN  13  14   CREATSERUM  0.64  0.53*   CA  8.9  9.0   NA  143  140   K  3.8  4.1   CL  111  107   CO2  25.0  26.0               Assessment & Discus

## 2017-09-06 NOTE — PHYSICAL THERAPY NOTE
PHYSICAL THERAPY EVALUATION - INPATIENT     Room Number: 4817/8497-L  Evaluation Date: 9/6/2017  Type of Evaluation: Re-evaluation  Physician Order: PT Eval and Treat    Presenting Problem: s/p CEA 9/5  Reason for Therapy: Mobility Dysfunction and Disc N/A      Comment: Procedure: LUMBAR / TRANSFORAMINAL EPIDURAL                STEROID INJECTION;  Surgeon: Christene Buerger, DO;  Location: [address] [place] [address]  11/19/2015: INJECTION, W/WO CONTRAST, DX/THERAPEUTIC SUBST* N/A      Comment Sitting: Good  Dynamic Sitting: Not tested  Static Standing: Good  Dynamic Standing: Fair    ADDITIONAL TESTS  Additional Tests: Other (Comment)                    Other Test(s): 5x STS 9 sec            NEUROLOGICAL FINDINGS  Neurological Findings: Coordin training  discharge planning    Patient End of Session: In bed;Needs met;Call light within reach;RN aware of session/findings; All patient questions and concerns addressed    ASSESSMENT   Patient is a 57year old female admitted on 9/2/2017 for L sided weak established on 9/6/2017

## 2017-09-06 NOTE — SLP NOTE
SPEECH DAILY NOTE - INPATIENT    Evaluation Date: 09/06/17    ASSESSMENT & PLAN   ASSESSMENT  Pt seen for dysphagia tx to assess tolerance with recommended diet, ensure appropriate utilization of aspiration precautions and provide pt/family education.  Pt f precautions and swallow strategies independently over 1-2 session(s).  GOAL Met       Goal #3 Communication Eval - not warranted; MRI negative for CVA & comm skills returned to baseline       FOLLOW UP  Follow Up Needed: No  SLP Follow-up Date: 09/06/17

## 2017-09-06 NOTE — OPERATIVE REPORT
[facility]    PATIENT'S NAME: [email or web address], [facility]   ATTENDING PHYSICIAN: Vanna Campos MD   OPERATING PHYSICIAN: Balta Palomo M.D.    PATIENT ACCOUNT#:   [de-identified]    LOCATION:  [address] [place] [address]  MEDICAL RECORD #:   [id number]       DATE OF BIRTH: catheter placed by Surgery, arterial lines and IVs by Anesthesia. She had the right neck and upper chest as well as the right and left thigh prepped and draped in the usual sterile technique. Ioban Vi-Drape used to cover the operative field.   The lydia and superior thyroidal artery as well as an angled PV clamp on the common carotid artery. The carotid bifurcation was sharply mobilized upward out of the wound. Arteriotomy was performed, extended with the Steinberg scissors.   There was a severe ulcerated le suctioned clean. The remaining portion of the anastomosis was completed. Backbleeding was allowed from the internal carotid artery to flush out all the air.     After completion of the anastomosis, flow was then established from the common, up the externa

## 2017-09-06 NOTE — OCCUPATIONAL THERAPY NOTE
OCCUPATIONAL THERAPY QUICK EVALUATION - INPATIENT    Room Number: 0733/8020-D  Evaluation Date: 9/3/2017     Type of Evaluation: Quick Eval  Presenting Problem: L sided weakness    Physician Order: IP Consult to Occupational Therapy  Reason for Therapy:  A pressure    • Impaired fasting blood sugar        Past Surgical History  Past Surgical History:  No date: CHOLECYSTECTOMY      Comment: 10/1/11, ERCP with stone extraction  11/10/2015: COLONOSCOPY,BIOPSY N/A      Comment: Procedure: COLONOSCOPY, POSSIBLE B BEARING RESTRICTION  Weight Bearing Restriction: None                PAIN ASSESSMENT  Ratin  Location: n/a       COGNITION  WFL    RANGE OF MOTION AND STRENGTH ASSESSMENT  Upper extremity ROM is within functional limits     Upper extremity strength is skilled Occupational Therapy needs at this time. Patient discharged from Occupational Therapy services. Please re-order if a new functional limitation presents during this admission.     Patient was able to achieve the following goals:  Patient able to to

## 2017-09-06 NOTE — PROGRESS NOTES
No complaints. Vital signs stable. Neurologically intact. Wounds clean/dry./ Traches in midline. Discussed her surgery/post-op wound care activity.  Told no tobacco.May transfer to telemetry

## 2017-09-06 NOTE — PROGRESS NOTES
Patient underwent Right carotid endarterectomy and was transferred to CNICU post operative for higher level of care. Neurologically intact with no deficits noted; complaints of pain from procedure.       Continue Neuro checks Q4hr  -Nicardipine to [facility]

## 2017-09-06 NOTE — PROGRESS NOTES
1615-Admitted to  6613 s/p rt carotid endartectomy w vein patch. VS per protocol. SBP > 200 after cedric calibrated, Dr Ghazala Lott in to see, will start Cardene to maintain BP< 150.  Neuro intact, c/o severe rt \"tooth & jaw pain\", freq non productive cough, ta

## 2017-09-06 NOTE — PLAN OF CARE
Received this am, awake and alert, sitting up in chair, hemodynamically stable on monitor. Denies pain after receiving pain meds. Up to bathroom, voided. Smoking cessation videos placed and currently viewing.  Neurologically intact, see neuro flow sheet for

## 2017-09-06 NOTE — PROGRESS NOTES
[facility] Cardiology  Progress Note    Chrystal Trotterkaila Patient Status:  Inpatient    3/2/1955 MRN [id number]   [facility] 6NE-A Attending Tani Ulrich MD   [address] Day # 4 PCP [facility],        Assessment and Plan:    1.  S/P CEA - post op day HCT  43.1  38.9   MCV  89.6  89.6   MCH  30.8  30.4   MCHC  34.3  33.9   RDW  13.4  13.3   WBC  16.0*  12.4   PLT  328.0  304.0       Medications:    Infusions:     • Nitroglycerin in D5W     • lactated ringers 125 mL/hr at 09/06/17 0830   • NiCARdipine

## 2017-09-06 NOTE — PROGRESS NOTES
[facility]  Progress Note     Ulises Suero Patient Status:  Inpatient    3/2/1955 MRN [id number]   [facility] 7NE-A Attending Vinita Huang MD   [facility] Day # 4 PCP Cinthia Ball     Chief Complaint: L sided weakness     S: No acu controlled  4. Prediabetes  5. DL, statin  6. Tobacco use   1.  Cessation     Plan of care: transfer to floor, d/c planning     Quality:  · DVT Prophylaxis: SCD  · CODE status: Full  · Valerio: none  · Central line: none    Estimated date of discharge: TBD  D

## 2017-09-07 LAB
GLUCOSE BLD-MCNC: 81 MG/DL (ref 65–99)
GLUCOSE BLD-MCNC: 96 MG/DL (ref 65–99)
GLUCOSE BLD-MCNC: 99 MG/DL (ref 65–99)
GLUCOSE BLD-MCNC: 99 MG/DL (ref 65–99)

## 2017-09-07 PROCEDURE — 99232 SBSQ HOSP IP/OBS MODERATE 35: CPT | Performed by: HOSPITALIST

## 2017-09-07 PROCEDURE — 99233 SBSQ HOSP IP/OBS HIGH 50: CPT | Performed by: OTHER

## 2017-09-07 RX ORDER — LISINOPRIL 10 MG/1
10 TABLET ORAL DAILY
Qty: 30 TABLET | Refills: 0 | Status: SHIPPED | OUTPATIENT
Start: 2017-09-07 | End: 2020-11-19

## 2017-09-07 RX ORDER — ATORVASTATIN CALCIUM 80 MG/1
80 TABLET, FILM COATED ORAL NIGHTLY
Status: DISCONTINUED | OUTPATIENT
Start: 2017-09-07 | End: 2017-09-08

## 2017-09-07 RX ORDER — LISINOPRIL 10 MG/1
10 TABLET ORAL DAILY
Status: DISCONTINUED | OUTPATIENT
Start: 2017-09-07 | End: 2017-09-08

## 2017-09-07 NOTE — PROGRESS NOTES
Discussed with Patient's RN- Jose Espinoza- doing well neurologically.  Continue wound care/activity- hopefully home tomorrow

## 2017-09-07 NOTE — PROGRESS NOTES
[facility]  Neurology  Notes  Affiliated with [address]  2017, 1:25 PM     Vivek Lott Patient Status:  Inpatient    3/2/1955 MRN [id number]   [facility] 6NE-A Attending Luís Price MD   [facility] (   ) Drug monitoring    (   ) PCA    (   ) Organ failure    (   ) De-escalation of treatment - DNR    (   ) Acute neurologic changes    (   ) Others: New Rx    (   ) ICU >35 minutes total time  PROCEDURE DONE    (   ) see notes

## 2017-09-07 NOTE — PROGRESS NOTES
[facility] Cardiology  Progress Note    Debbie Allen Patient Status:  Inpatient    3/2/1955 MRN [id number]   [facility] 6NE-A Attending Pacheco Miller MD   [facility] Day # 5 PCP [facility], JABARI       Assessment and Plan:    1.  S/P CEA - post op day 30.8  30.4   MCHC  34.3  33.9   RDW  13.4  13.3   WBC  16.0*  12.4   PLT  328.0  304.0       Medications:    Infusions:     • Nitroglycerin in D5W     • lactated ringers 125 mL/hr at 09/06/17 0830   • NiCARdipine Stopped (09/06/17 0400)   • phenylephrine

## 2017-09-07 NOTE — PLAN OF CARE
0800 RECEIVED REPORT,PT'S AWAKE SITTING UP I CHAIR, RIGHT NECK INCISION BENJIE WITH STERI STRIPS INTACT RIGHT GROIN STATES NO C/O. EATING BREAKFAST, NEURO CHECK INTACT. [facility] D/C HOME.

## 2017-09-07 NOTE — PROGRESS NOTES
Godwin Barber, RN Registered Nurse Signed Nursing  Progress Notes Date of Service: 9/6/2017  6:00 PM         []Manual[]Template  [x]Copied  [x]Tavon Salcido, RN    []Jacielver for attribution informationClear filters  Care asumed @ 1500.  Awake, up in c

## 2017-09-07 NOTE — PROGRESS NOTES
[facility] HOSPITALIST  Progress Note     Vivek Lott Patient Status:  Inpatient    3/2/1955 MRN [id number]   [facility] 7NE-A Attending Eva Tan MD   [facility] Day # 5 PCP Joseph Gallo     Chief Complaint: L sided weakness     S: No acu mildly elevated this morning, ACE resumed  4. Prediabetes  5. DL, statin  6. Tobacco use   1.  Cessation     Plan of care: d/c planning     Quality:  · DVT Prophylaxis: SCD  · CODE status: Full  · Valerio: none  · Central line: none    Estimated date of disch

## 2017-09-07 NOTE — PLAN OF CARE
Transferred to floor @1600. AOx4. VSS on RA. No complaints of pain. NSR on tele. Neck incision: steri-strips, approximated, c/d/i.   R upper thigh incision: dermabond, approximated, c/d/i.    Complaining of \"swooshing\" in L ear and nose bleed when s

## 2017-09-07 NOTE — PAYOR COMM NOTE
--------------  CONTINUED STAY REVIEW    Payor: Naldo Montgomery #:  [id number]  Authorization Number: [id number]    Admit date: 9/2/17  Admit time: 1245    Admitting Physician: Mahsa Braden MD  Attending Physician: CQ) 14 MG/24HR 1 patch     Date Action Dose Route User    9/7/2017 1110 Patch applied 1 patch Transdermal (Right Upper Back) Isabela Rooney, RN            Plan: 9/4  Waiting for final recommendation from Vascular Surgery  Keep on ASA  Counseled of stroke r

## 2017-09-07 NOTE — PHYSICAL THERAPY NOTE
PHYSICAL THERAPY TREATMENT NOTE - INPATIENT    Room Number: 0825/5664-A     Session: 1  Number of Visits to Meet Established Goals:  (1-2 sessions)    Presenting Problem: s/p CEA 9/5    Problem List  Principal Problem:    Acute left-sided weakness  Active self-stated goal is get back to home.     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 Increased time spent today on discussion regarding activity level, activity pacing, fall precautions, and safety awareness. Pt in understanding.      THERAPEUTIC EXERCISES  Lower Extremity Alternating marching  Hip AB/AD  Knee extension  To

## 2017-09-08 VITALS
HEART RATE: 80 BPM | OXYGEN SATURATION: 91 % | BODY MASS INDEX: 28.88 KG/M2 | WEIGHT: 195 LBS | RESPIRATION RATE: 23 BRPM | TEMPERATURE: 99 F | SYSTOLIC BLOOD PRESSURE: 126 MMHG | DIASTOLIC BLOOD PRESSURE: 52 MMHG | HEIGHT: 69 IN

## 2017-09-08 LAB — GLUCOSE BLD-MCNC: 112 MG/DL (ref 65–99)

## 2017-09-08 PROCEDURE — 99239 HOSP IP/OBS DSCHRG MGMT >30: CPT | Performed by: HOSPITALIST

## 2017-09-08 RX ORDER — ASPIRIN 81 MG/1
81 TABLET ORAL DAILY
Qty: 30 TABLET | Refills: 3 | Status: SHIPPED | OUTPATIENT
Start: 2017-09-08

## 2017-09-08 RX ORDER — ATORVASTATIN CALCIUM 80 MG/1
40 TABLET, FILM COATED ORAL NIGHTLY
Qty: 30 TABLET | Refills: 0 | Status: SHIPPED | OUTPATIENT
Start: 2017-09-08 | End: 2017-10-10

## 2017-09-08 RX ORDER — CLOPIDOGREL BISULFATE 75 MG/1
75 TABLET ORAL DAILY
Qty: 30 TABLET | Refills: 3 | Status: SHIPPED | OUTPATIENT
Start: 2017-09-08 | End: 2020-11-19

## 2017-09-08 NOTE — PROGRESS NOTES
[facility] HOSPITALIST  Progress Note     Karina Duarte Patient Status:  Inpatient    3/2/1955 MRN [id number]   [facility] 7NE-A Attending Shivam Oritz MD   [facility] Day # 6 PCP Erminia Lesch     Chief Complaint: L sided weakness     S: No acu BS controlled  5. DL, statin  6. Tobacco use   1.  Cessation     Plan of care: d/c planning     Quality:  · DVT Prophylaxis: SCDs  · CODE status: Full  · Valerio: none  · Central line: none    Estimated date of discharge: 9/8  Discharge is dependent on: post

## 2017-09-08 NOTE — PLAN OF CARE
Pt AOx4. RA.  .  NSR. Pt c/o pain to right neck. Norco po pain. Neuro Q4. Right neck sutures CDI. Will continue to monitor.

## 2017-09-08 NOTE — PLAN OF CARE
Impaired Functional Mobility    • Achieve highest/safest level of mobility/gait Progressing        Impaired Swallowing    • Minimize aspiration risk Progressing        NEUROLOGICAL - ADULT    • Achieves maximal functionality and self care Progressing

## 2017-09-08 NOTE — PROGRESS NOTES
No complaints. Neurologically intact. Wounds clean/dry. Trachea midline. Instructed post-op wound care/activity/follow up.  Social Service for discharge planning and need for [address]

## 2017-09-08 NOTE — PROGRESS NOTES
Seen and examined. Looks and feels well. Plans are for discharge today.     Afebrile  126/52  80 regular    Neck incision looks good  Lungs clear  Ht RRR  abd soft  Ext no LE edema    A/P: POD #3 s/p right CEA secondary to symptomatic carotid stenosis    Ho

## 2017-09-09 NOTE — DISCHARGE SUMMARY
[facility] HOSPITALIST  DISCHARGE SUMMARY     Kathryn Manuel Patient Status:  Inpatient    3/2/1955 MRN [id number]   [facility] 7NE-A Attending No att. providers found   Hosp Day # 6 PCP Gertrudis Wade     Date of Admission: 2017  Date of [place]/MRI was unremarkable except for severe stenosis of the right internal carotid artery. She underwent a right carotid endarterectomy per Dr. Rubi King. She tolerated the procedure well.   Her symptoms improved and she was cleared for d/c by all consultants your prescriptions at the location directed by your doctor or nurse    Bring a paper prescription for each of these medications  · aspirin 81 MG Tbec  · atorvastatin 80 MG Tabs  · Clopidogrel Bisulfate 75 MG Tabs         Follow-up appointment:   Guzman Hays

## 2017-09-12 NOTE — PAYOR COMM NOTE
--------------  DISCHARGE REVIEW    Payor: Naldo Montgomery #:  [id number]  Authorization Number: [id number]    Admit date: 9/2/17  Admit time:  1245  Discharge Date: 9/8/2017  1:42 PM     Admitting Physician: Kalyani De Luna out of her car due to L arm clumsiness and inability to  her L leg. Initially was very confused about what was going on, and did not realize anything was wrong.   Called a passer by for help and patient was brought tot the Er.      In er weakness had Soln  Commonly known as:  CILOXAN      Apply 2 drops q2 hours x 24 hours then bid x 6 days   Quantity:  5 mL  Refills:  0     HYDROcodone-acetaminophen 5-325 MG Tabs  Commonly known as:  NORCO      Take 1 tablet by mouth every 6 (six) hours as needed for P

## 2017-09-21 NOTE — PAYOR COMM NOTE
--------------  CONTINUED STAY REVIEW    Payor: Naldo Montgomery #:  [id number]  Authorization Number: [id number]    Admit date: 9/2/17  Admit time: 1245    Admitting Physician: Roberto Garnica MD  Attending Physician: Creatinine Clearance: 112.1 mL/min (based on SCr of 0.53 mg/dL).     No results for input(s): PTP, INR in the last 72 hours.     No results for input(s): TROP, CK in the last 72 hours.           Imaging: Imaging data reviewed in Epic.     Medications:   •

## 2017-10-10 ENCOUNTER — APPOINTMENT (OUTPATIENT)
Dept: LAB | Age: 62
End: 2017-10-10
Attending: PHYSICIAN ASSISTANT
Payer: MEDICAID

## 2017-10-10 ENCOUNTER — OFFICE VISIT (OUTPATIENT)
Dept: NEUROLOGY | Facility: CLINIC | Age: 62
End: 2017-10-10

## 2017-10-10 VITALS
RESPIRATION RATE: 16 BRPM | SYSTOLIC BLOOD PRESSURE: 129 MMHG | WEIGHT: 193 LBS | DIASTOLIC BLOOD PRESSURE: 62 MMHG | HEIGHT: 69 IN | BODY MASS INDEX: 28.58 KG/M2 | HEART RATE: 72 BPM

## 2017-10-10 DIAGNOSIS — R41.3 MEMORY LOSS: ICD-10-CM

## 2017-10-10 DIAGNOSIS — G45.1 HEMISPHERIC CAROTID ARTERY SYNDROME: Primary | ICD-10-CM

## 2017-10-10 DIAGNOSIS — R53.1 LEFT-SIDED WEAKNESS: ICD-10-CM

## 2017-10-10 DIAGNOSIS — F32.A ANXIETY AND DEPRESSION: ICD-10-CM

## 2017-10-10 DIAGNOSIS — Z98.890 HISTORY OF CEA (CAROTID ENDARTERECTOMY): ICD-10-CM

## 2017-10-10 DIAGNOSIS — F41.9 ANXIETY AND DEPRESSION: ICD-10-CM

## 2017-10-10 DIAGNOSIS — I65.21 CAROTID STENOSIS, SYMPTOMATIC W/O INFARCT, RIGHT: ICD-10-CM

## 2017-10-10 PROCEDURE — 82607 VITAMIN B-12: CPT | Performed by: PHYSICIAN ASSISTANT

## 2017-10-10 PROCEDURE — 99214 OFFICE O/P EST MOD 30 MIN: CPT | Performed by: PHYSICIAN ASSISTANT

## 2017-10-10 PROCEDURE — 36415 COLL VENOUS BLD VENIPUNCTURE: CPT | Performed by: PHYSICIAN ASSISTANT

## 2017-10-10 PROCEDURE — 82746 ASSAY OF FOLIC ACID SERUM: CPT | Performed by: PHYSICIAN ASSISTANT

## 2017-10-10 RX ORDER — ATORVASTATIN CALCIUM 40 MG/1
40 TABLET, FILM COATED ORAL NIGHTLY
COMMUNITY
End: 2018-05-18

## 2017-10-10 RX ORDER — ESCITALOPRAM OXALATE 5 MG/1
TABLET ORAL
Qty: 30 TABLET | Refills: 2 | Status: SHIPPED | OUTPATIENT
Start: 2017-10-10 | End: 2018-05-08 | Stop reason: ALTCHOICE

## 2017-10-10 NOTE — PATIENT INSTRUCTIONS
Refill policies:    • Allow 2-3 business days for refills; controlled substances may take longer.   • Contact your pharmacy at least 5 days prior to running out of medication and have them send an electronic request or submit request through the [facility] have a procedure or additional testing performed. [facility]) will contact your insurance carrier to obtain pre-certification or prior authorization.     Unfortunately, [employer] has seen an increase in denial of payment even though the p

## 2017-10-13 ENCOUNTER — TELEPHONE (OUTPATIENT)
Dept: NEUROLOGY | Facility: CLINIC | Age: 62
End: 2017-10-13

## 2017-10-13 NOTE — TELEPHONE ENCOUNTER
----- Message from LEANNA Frias sent at 10/11/2017  8:26 PM CDT -----  Please call to inform patient that vitamin b12 level is low. If she is not taking vitamin b12 I would recommend 1000mcg daily.  Her folic acid level is low and I would recommend 0

## 2017-10-16 NOTE — TELEPHONE ENCOUNTER
Informed patient of test results as noted. No further questions at this time. Advised to call back should additional questions arise.

## 2017-10-23 ENCOUNTER — PRIOR ORIGINAL RECORDS (OUTPATIENT)
Dept: OTHER | Age: 62
End: 2017-10-23

## 2017-10-27 ENCOUNTER — PRIOR ORIGINAL RECORDS (OUTPATIENT)
Dept: OTHER | Age: 62
End: 2017-10-27

## 2017-10-30 ENCOUNTER — HOSPITAL ENCOUNTER (OUTPATIENT)
Dept: MRI IMAGING | Age: 62
Discharge: HOME | End: 2017-10-30
Attending: PHYSICIAN ASSISTANT
Payer: MEDICAID

## 2017-10-30 DIAGNOSIS — R41.3 MEMORY LOSS: ICD-10-CM

## 2017-10-30 PROCEDURE — A9575 INJ GADOTERATE MEGLUMI 0.1ML: HCPCS | Performed by: PHYSICIAN ASSISTANT

## 2017-10-30 PROCEDURE — 70553 MRI BRAIN STEM W/O & W/DYE: CPT | Performed by: PHYSICIAN ASSISTANT

## 2017-10-31 ENCOUNTER — TELEPHONE (OUTPATIENT)
Dept: NEUROLOGY | Facility: CLINIC | Age: 62
End: 2017-10-31

## 2017-10-31 NOTE — TELEPHONE ENCOUNTER
----- Message from LEANNA Vasquez sent at 10/30/2017  2:30 PM CDT -----  Please call to inform patient that MRI Brain was stable.

## 2017-11-01 ENCOUNTER — PRIOR ORIGINAL RECORDS (OUTPATIENT)
Dept: OTHER | Age: 62
End: 2017-11-01

## 2017-11-16 ENCOUNTER — OFFICE VISIT (OUTPATIENT)
Dept: NEUROLOGY | Facility: CLINIC | Age: 62
End: 2017-11-16

## 2017-11-16 VITALS
HEART RATE: 77 BPM | BODY MASS INDEX: 28.58 KG/M2 | RESPIRATION RATE: 16 BRPM | HEIGHT: 69 IN | DIASTOLIC BLOOD PRESSURE: 60 MMHG | WEIGHT: 193 LBS | SYSTOLIC BLOOD PRESSURE: 122 MMHG

## 2017-11-16 DIAGNOSIS — R41.3 MEMORY CHANGES: ICD-10-CM

## 2017-11-16 DIAGNOSIS — R53.1 ACUTE LEFT-SIDED WEAKNESS: ICD-10-CM

## 2017-11-16 DIAGNOSIS — I65.21 CAROTID STENOSIS, SYMPTOMATIC W/O INFARCT, RIGHT: Primary | ICD-10-CM

## 2017-11-16 PROCEDURE — 99213 OFFICE O/P EST LOW 20 MIN: CPT | Performed by: OTHER

## 2017-11-16 RX ORDER — ESCITALOPRAM OXALATE 10 MG/1
10 TABLET ORAL DAILY
Qty: 30 TABLET | Refills: 5 | Status: SHIPPED | OUTPATIENT
Start: 2017-11-16 | End: 2018-05-11

## 2017-11-16 RX ORDER — DONEPEZIL HYDROCHLORIDE 5 MG/1
5 TABLET, FILM COATED ORAL NIGHTLY
Qty: 30 TABLET | Refills: 5 | Status: SHIPPED | OUTPATIENT
Start: 2017-11-16 | End: 2018-05-11

## 2017-11-16 NOTE — PROGRESS NOTES
[facility] with [address]  3/2/1955  Primary Care Provider:  Linda Prince    11/16/2017    57year old yo patient being seen for:  Post stroke    She has recovered quite motor and sensory deficits. DTRs were symmetric and no upper motor neuron signs.  Coordination was normal.        IMPRESSION & PLANS:  Carotid stenosis, symptomatic w/o infarct, right  (primary encounter diagnosis)  Acute left-sided weakness  Memory changes

## 2017-11-16 NOTE — PATIENT INSTRUCTIONS
Refill policies:    • Allow 2-3 business days for refills; controlled substances may take longer.   • Contact your pharmacy at least 5 days prior to running out of medication and have them send an electronic request or submit request through the [facility] have a procedure or additional testing performed. [facility]) will contact your insurance carrier to obtain pre-certification or prior authorization.     Unfortunately, [employer] has seen an increase in denial of payment even though the p

## 2017-11-24 LAB
ALBUMIN: 4.2 G/DL
ALKALINE PHOSPHATATE(ALK PHOS): 81 IU/L
BILIRUBIN TOTAL: 0.2 MG/DL
BUN: 11 MG/DL
CALCIUM: 10.1 MG/DL
CHLORIDE: 101 MEQ/L
CREATININE, SERUM: 0.71 MG/DL
GLOBULIN: 2.5 G/DL
GLUCOSE: 97 MG/DL
HEMATOCRIT: 43.6 %
HEMOGLOBIN: 14.6 G/DL
PLATELETS: 366 K/UL
POTASSIUM, SERUM: 4.3 MEQ/L
PROTEIN, TOTAL: 6.7 G/DL
RED BLOOD COUNT: 4.81 X 10-6/U
SGOT (AST): 16 IU/L
SGPT (ALT): 18 IU/L
SODIUM: 141 MEQ/L
WHITE BLOOD COUNT: 9.5 X 10-3/U

## 2018-04-04 ENCOUNTER — PRIOR ORIGINAL RECORDS (OUTPATIENT)
Dept: OTHER | Age: 63
End: 2018-04-04

## 2018-04-07 ENCOUNTER — PRIOR ORIGINAL RECORDS (OUTPATIENT)
Dept: OTHER | Age: 63
End: 2018-04-07

## 2018-04-07 ENCOUNTER — LAB ENCOUNTER (OUTPATIENT)
Dept: LAB | Age: 63
End: 2018-04-07
Attending: INTERNAL MEDICINE
Payer: MEDICARE

## 2018-04-07 DIAGNOSIS — I10 ESSENTIAL HYPERTENSION, BENIGN: ICD-10-CM

## 2018-04-07 DIAGNOSIS — E78.00 PURE HYPERCHOLESTEROLEMIA: Primary | ICD-10-CM

## 2018-04-07 PROCEDURE — 83036 HEMOGLOBIN GLYCOSYLATED A1C: CPT

## 2018-04-07 PROCEDURE — 80061 LIPID PANEL: CPT

## 2018-04-07 PROCEDURE — 80053 COMPREHEN METABOLIC PANEL: CPT

## 2018-04-11 ENCOUNTER — PRIOR ORIGINAL RECORDS (OUTPATIENT)
Dept: OTHER | Age: 63
End: 2018-04-11

## 2018-04-11 LAB
ALBUMIN: 3.9 G/DL
ALKALINE PHOSPHATATE(ALK PHOS): 66 IU/L
ALT (SGPT): 35 U/L
AST (SGOT): 19 U/L
BILIRUBIN TOTAL: 0.6 MG/DL
BUN: 14 MG/DL
CALCIUM: 9.9 MG/DL
CHLORIDE: 106 MEQ/L
CHOLESTEROL, TOTAL: 137 MG/DL
CHOLESTEROL, TOTAL: 137 MG/DL
CREATININE, SERUM: 0.87 MG/DL
GLUCOSE: 90 MG/DL
GLUCOSE: 90 MG/DL
HDL CHOLESTEROL: 47 MG/DL
HDL CHOLESTEROL: 47 MG/DL
HEMOGLOBIN A1C: 5.7 %
LDL CHOLESTEROL: 67 MG/DL
LDL CHOLESTEROL: 67 MG/DL
NON-HDL CHOLESTEROL: 90 MG/DL
NON-HDL CHOLESTEROL: 90 MG/DL
POTASSIUM, SERUM: 4.8 MEQ/L
PROTEIN, TOTAL: 7.1 G/DL
SGOT (AST): 19 IU/L
SGPT (ALT): 35 IU/L
SODIUM: 140 MEQ/L
TOTAL CHOLESTEROL / HDL RATIO: 2.91 RATIO UN
TRIGLYCERIDES: 117 MG/DL
TRIGLYCERIDES: 117 MG/DL

## 2018-04-18 ENCOUNTER — MYAURORA ACCOUNT LINK (OUTPATIENT)
Dept: OTHER | Age: 63
End: 2018-04-18

## 2018-04-18 ENCOUNTER — PRIOR ORIGINAL RECORDS (OUTPATIENT)
Dept: OTHER | Age: 63
End: 2018-04-18

## 2018-04-19 ENCOUNTER — PRIOR ORIGINAL RECORDS (OUTPATIENT)
Dept: OTHER | Age: 63
End: 2018-04-19

## 2018-04-20 ENCOUNTER — PRIOR ORIGINAL RECORDS (OUTPATIENT)
Dept: OTHER | Age: 63
End: 2018-04-20

## 2018-04-24 ENCOUNTER — PRIOR ORIGINAL RECORDS (OUTPATIENT)
Dept: OTHER | Age: 63
End: 2018-04-24

## 2018-04-25 ENCOUNTER — HOSPITAL (OUTPATIENT)
Dept: OTHER | Age: 63
End: 2018-04-25
Attending: INTERNAL MEDICINE

## 2018-04-25 ENCOUNTER — PRIOR ORIGINAL RECORDS (OUTPATIENT)
Dept: OTHER | Age: 63
End: 2018-04-25

## 2018-04-25 LAB
ANALYZER ANC (IANC): NORMAL
ERYTHROCYTE [DISTWIDTH] IN BLOOD: 13.8 % (ref 11–15)
HEMATOCRIT: 44.7 % (ref 36–46.5)
HGB BLD-MCNC: 15 GM/DL (ref 12–15.5)
MCH RBC QN AUTO: 30.7 PG (ref 26–34)
MCHC RBC AUTO-ENTMCNC: 33.6 GM/DL (ref 32–36.5)
MCV RBC AUTO: 91.6 FL (ref 78–100)
PERCENT NRBC: NORMAL
PLATELET # BLD: 322 THOUSAND/MCL (ref 140–450)
RBC # BLD: 4.88 MILLION/MCL (ref 4–5.2)
WBC # BLD: 8.8 THOUSAND/MCL (ref 4.2–11)

## 2018-04-26 LAB — PA ADP PRP-ACNC: 16 PRU (ref 194–418)

## 2018-05-02 ENCOUNTER — PRIOR ORIGINAL RECORDS (OUTPATIENT)
Dept: OTHER | Age: 63
End: 2018-05-02

## 2018-05-03 ENCOUNTER — PRIOR ORIGINAL RECORDS (OUTPATIENT)
Dept: OTHER | Age: 63
End: 2018-05-03

## 2018-05-03 ENCOUNTER — HOSPITAL (OUTPATIENT)
Dept: OTHER | Age: 63
End: 2018-05-03
Attending: INTERNAL MEDICINE

## 2018-05-03 LAB
ANALYZER ANC (IANC): ABNORMAL
ANION GAP SERPL CALC-SCNC: 14 MMOL/L (ref 10–20)
BASOPHILS # BLD: 0.1 THOUSAND/MCL (ref 0–0.3)
BASOPHILS NFR BLD: 1 %
BUN SERPL-MCNC: 12 MG/DL (ref 6–20)
BUN/CREAT SERPL: 16 (ref 7–25)
CALCIUM SERPL-MCNC: 10.4 MG/DL (ref 8.4–10.2)
CHLORIDE: 105 MMOL/L (ref 98–107)
CO2 SERPL-SCNC: 25 MMOL/L (ref 21–32)
CREAT SERPL-MCNC: 0.77 MG/DL (ref 0.51–0.95)
DIFFERENTIAL METHOD BLD: ABNORMAL
EOSINOPHIL # BLD: 0.4 THOUSAND/MCL (ref 0.1–0.5)
EOSINOPHIL NFR BLD: 3 %
ERYTHROCYTE [DISTWIDTH] IN BLOOD: 13.8 % (ref 11–15)
GLUCOSE SERPL-MCNC: 102 MG/DL (ref 65–99)
HEMATOCRIT: 45 % (ref 36–46.5)
HGB BLD-MCNC: 15.6 GM/DL (ref 12–15.5)
LYMPHOCYTES # BLD: 2.9 THOUSAND/MCL (ref 1–4)
LYMPHOCYTES NFR BLD: 25 %
MCH RBC QN AUTO: 31.8 PG (ref 26–34)
MCHC RBC AUTO-ENTMCNC: 34.7 GM/DL (ref 32–36.5)
MCV RBC AUTO: 91.8 FL (ref 78–100)
MONOCYTES # BLD: 0.7 THOUSAND/MCL (ref 0.3–0.9)
MONOCYTES NFR BLD: 6 %
NEUTROPHILS # BLD: 7.5 THOUSAND/MCL (ref 1.8–7.7)
NEUTROPHILS NFR BLD: 65 %
NEUTS SEG NFR BLD: ABNORMAL %
PERCENT NRBC: ABNORMAL
PLATELET # BLD: 366 THOUSAND/MCL (ref 140–450)
POTASSIUM SERPL-SCNC: 4.2 MMOL/L (ref 3.4–5.1)
RBC # BLD: 4.9 MILLION/MCL (ref 4–5.2)
SODIUM SERPL-SCNC: 140 MMOL/L (ref 135–145)
WBC # BLD: 11.6 THOUSAND/MCL (ref 4.2–11)

## 2018-05-04 LAB
ANION GAP SERPL CALC-SCNC: 13 MMOL/L (ref 10–20)
BUN SERPL-MCNC: 14 MG/DL (ref 6–20)
BUN/CREAT SERPL: 17 (ref 7–25)
CALCIUM SERPL-MCNC: 9.4 MG/DL (ref 8.4–10.2)
CHLORIDE: 107 MMOL/L (ref 98–107)
CO2 SERPL-SCNC: 26 MMOL/L (ref 21–32)
CREAT SERPL-MCNC: 0.82 MG/DL (ref 0.51–0.95)
GLUCOSE SERPL-MCNC: 93 MG/DL (ref 65–99)
POTASSIUM SERPL-SCNC: 4.6 MMOL/L (ref 3.4–5.1)
SODIUM SERPL-SCNC: 141 MMOL/L (ref 135–145)

## 2018-05-07 LAB
BUN: 12 MG/DL
CALCIUM: 10.4 MG/DL
CHLORIDE: 105 MEQ/L
CREATININE, SERUM: 0.77 MG/DL
GLUCOSE: 102 MG/DL
HEMATOCRIT: 45 %
HEMOGLOBIN: 15.6 G/DL
PLATELETS: 366 K/UL
POTASSIUM, SERUM: 4.2 MEQ/L
RED BLOOD COUNT: 4.9 X 10-6/U
SODIUM: 140 MEQ/L
WHITE BLOOD COUNT: 11.6 X 10-3/U

## 2018-05-11 ENCOUNTER — PRIOR ORIGINAL RECORDS (OUTPATIENT)
Dept: OTHER | Age: 63
End: 2018-05-11

## 2018-05-11 ENCOUNTER — MYAURORA ACCOUNT LINK (OUTPATIENT)
Dept: OTHER | Age: 63
End: 2018-05-11

## 2018-05-11 RX ORDER — ESCITALOPRAM OXALATE 10 MG/1
TABLET ORAL
Qty: 30 TABLET | Refills: 3 | Status: SHIPPED | OUTPATIENT
Start: 2018-05-11 | End: 2018-05-18

## 2018-05-11 RX ORDER — DONEPEZIL HYDROCHLORIDE 5 MG/1
TABLET, FILM COATED ORAL
Qty: 30 TABLET | Refills: 3 | Status: SHIPPED | OUTPATIENT
Start: 2018-05-11 | End: 2018-06-05 | Stop reason: ALTCHOICE

## 2018-05-11 NOTE — TELEPHONE ENCOUNTER
Medication: Donepezil 5 mg & Lexapro 10 mg    Date of last refill:11/16/17 with 5 addt refills  [place]     Last office visit: 11/16/2017  Due back to clinic per last office note:  RTN in 6 months  Date next office visit

## 2018-05-16 ENCOUNTER — PRIOR ORIGINAL RECORDS (OUTPATIENT)
Dept: OTHER | Age: 63
End: 2018-05-16

## 2018-05-16 ENCOUNTER — MYAURORA ACCOUNT LINK (OUTPATIENT)
Dept: OTHER | Age: 63
End: 2018-05-16

## 2018-05-17 LAB
HEMATOCRIT: 44.7 %
HEMOGLOBIN: 15 G/DL
PLATELETS: 322 K/UL
RED BLOOD COUNT: 4.88 X 10-6/U
WHITE BLOOD COUNT: 8.8 X 10-3/U

## 2018-05-18 ENCOUNTER — OFFICE VISIT (OUTPATIENT)
Dept: NEUROLOGY | Facility: CLINIC | Age: 63
End: 2018-05-18

## 2018-05-18 VITALS
WEIGHT: 247 LBS | SYSTOLIC BLOOD PRESSURE: 126 MMHG | HEIGHT: 69 IN | RESPIRATION RATE: 16 BRPM | HEART RATE: 74 BPM | BODY MASS INDEX: 36.58 KG/M2 | DIASTOLIC BLOOD PRESSURE: 72 MMHG

## 2018-05-18 DIAGNOSIS — R41.89 COGNITIVE CHANGE: ICD-10-CM

## 2018-05-18 DIAGNOSIS — I65.21 CAROTID STENOSIS, SYMPTOMATIC W/O INFARCT, RIGHT: Primary | ICD-10-CM

## 2018-05-18 DIAGNOSIS — R63.5 WEIGHT GAIN: ICD-10-CM

## 2018-05-18 PROCEDURE — 99214 OFFICE O/P EST MOD 30 MIN: CPT | Performed by: OTHER

## 2018-05-18 RX ORDER — ESCITALOPRAM OXALATE 10 MG/1
20 TABLET ORAL
Qty: 60 TABLET | Refills: 5 | Status: SHIPPED | OUTPATIENT
Start: 2018-05-18 | End: 2018-05-30

## 2018-05-18 NOTE — PROGRESS NOTES
[facility] with [address]  3/2/1955  Primary Care Provider:  Vasile Ruiz MD    5/18/2018    70year old yo patient being seen for:  stroke    stroke    Had Coronary ST no Cyanosis, pulses palpated. Amputated right hand    Alert with normal MS  CN functions: NICOLE, EOM full, no ptosis or nystagmus, VF intact, Face symmetric, sensation intact, hearing fine, tongue midline  No motor and sensory findings  DTRs symmetric.   Carmela Triana

## 2018-05-18 NOTE — PATIENT INSTRUCTIONS
Refill policies:    • Allow 2-3 business days for refills; controlled substances may take longer.   • Contact your pharmacy at least 5 days prior to running out of medication and have them send an electronic request or submit request through the “request re entire amount billed. Precertification and Prior Authorizations: If your physician has recommended that you have a procedure or additional testing performed.   [facility]) will contact your insurance carrier to obtain pre-certi

## 2018-05-22 ENCOUNTER — HOSPITAL (OUTPATIENT)
Dept: OTHER | Age: 63
End: 2018-05-22
Attending: INTERNAL MEDICINE

## 2018-05-24 ENCOUNTER — PRIOR ORIGINAL RECORDS (OUTPATIENT)
Dept: OTHER | Age: 63
End: 2018-05-24

## 2018-05-30 NOTE — TELEPHONE ENCOUNTER
Received notification that patient has quantity dose limits on escitalopram for 45 tabs per 30 days.     Script for new dosing of 20 mg tabs pended for MD approval.

## 2018-06-01 ENCOUNTER — HOSPITAL (OUTPATIENT)
Dept: OTHER | Age: 63
End: 2018-06-01
Attending: INTERNAL MEDICINE

## 2018-06-01 ENCOUNTER — PRIOR ORIGINAL RECORDS (OUTPATIENT)
Dept: OTHER | Age: 63
End: 2018-06-01

## 2018-06-11 ENCOUNTER — PRIOR ORIGINAL RECORDS (OUTPATIENT)
Dept: OTHER | Age: 63
End: 2018-06-11

## 2018-06-11 ENCOUNTER — HOSPITAL (OUTPATIENT)
Dept: OTHER | Age: 63
End: 2018-06-11
Attending: INTERNAL MEDICINE

## 2018-06-11 LAB
ALT SERPL-CCNC: 31 UNIT/L
AST SERPL-CCNC: 28 UNIT/L
CHOLEST SERPL-MCNC: 112 MG/DL
CHOLEST/HDLC SERPL: 2.2 {RATIO}
HDLC SERPL-MCNC: 52 MG/DL
LDLC SERPL CALC-MCNC: 43 MG/DL
LENGTH OF FAST TIME PATIENT: NORMAL HR
NONHDLC SERPL-MCNC: 60 MG/DL
PA ADP PRP-ACNC: 58 PRU (ref 194–418)
TRIGLYCERIDE (TRIGP): 87 MG/DL

## 2018-06-12 LAB
ALT (SGPT): 31 U/L
AST (SGOT): 28 U/L
CHOLESTEROL, TOTAL: 112 MG/DL
HDL CHOLESTEROL: 52 MG/DL
LDL CHOLESTEROL: 43 MG/DL
NON-HDL CHOLESTEROL: 60 MG/DL
TRIGLYCERIDES: 87 MG/DL

## 2018-06-15 ENCOUNTER — PRIOR ORIGINAL RECORDS (OUTPATIENT)
Dept: OTHER | Age: 63
End: 2018-06-15

## 2018-06-18 ENCOUNTER — TELEPHONE (OUTPATIENT)
Dept: NEUROLOGY | Facility: CLINIC | Age: 63
End: 2018-06-18

## 2018-06-18 DIAGNOSIS — R41.89 COGNITIVE CHANGE: ICD-10-CM

## 2018-06-18 RX ORDER — ESCITALOPRAM OXALATE 20 MG/1
20 TABLET ORAL DAILY
Qty: 30 TABLET | Refills: 5 | Status: SHIPPED | OUTPATIENT
Start: 2018-06-18 | End: 2018-12-26

## 2018-06-18 NOTE — TELEPHONE ENCOUNTER
Patient present to clarify medication. Verbalized confusion surrounding her Lexapro dosage. Explained that Dr. Joshua Mendenhall had increased her dosage from 10 mg per day to 20 mg per day at her office visit, but sent the script as 2 x 10 mg tablets.  Her insuran

## 2018-06-19 ENCOUNTER — PRIOR ORIGINAL RECORDS (OUTPATIENT)
Dept: OTHER | Age: 63
End: 2018-06-19

## 2018-06-20 ENCOUNTER — PRIOR ORIGINAL RECORDS (OUTPATIENT)
Dept: OTHER | Age: 63
End: 2018-06-20

## 2018-07-01 ENCOUNTER — HOSPITAL (OUTPATIENT)
Dept: OTHER | Age: 63
End: 2018-07-01
Attending: INTERNAL MEDICINE

## 2018-07-10 PROCEDURE — 87209 SMEAR COMPLEX STAIN: CPT | Performed by: INTERNAL MEDICINE

## 2018-07-10 PROCEDURE — 87272 CRYPTOSPORIDIUM AG IF: CPT | Performed by: INTERNAL MEDICINE

## 2018-07-10 PROCEDURE — 87427 SHIGA-LIKE TOXIN AG IA: CPT | Performed by: INTERNAL MEDICINE

## 2018-07-10 PROCEDURE — 87046 STOOL CULTR AEROBIC BACT EA: CPT | Performed by: INTERNAL MEDICINE

## 2018-07-10 PROCEDURE — 87045 FECES CULTURE AEROBIC BACT: CPT | Performed by: INTERNAL MEDICINE

## 2018-07-10 PROCEDURE — 87329 GIARDIA AG IA: CPT | Performed by: INTERNAL MEDICINE

## 2018-07-10 PROCEDURE — 87177 OVA AND PARASITES SMEARS: CPT | Performed by: INTERNAL MEDICINE

## 2018-08-01 ENCOUNTER — HOSPITAL (OUTPATIENT)
Dept: OTHER | Age: 63
End: 2018-08-01
Attending: INTERNAL MEDICINE

## 2018-08-08 PROBLEM — M51.16 DISPLACEMENT OF LUMBAR INTERVERTEBRAL DISC WITH RADICULOPATHY: Status: ACTIVE | Noted: 2018-08-08

## 2018-08-13 ENCOUNTER — PRIOR ORIGINAL RECORDS (OUTPATIENT)
Dept: OTHER | Age: 63
End: 2018-08-13

## 2018-09-12 PROBLEM — M48.061 SPINAL STENOSIS, LUMBAR REGION, WITHOUT NEUROGENIC CLAUDICATION: Status: ACTIVE | Noted: 2018-09-12

## 2018-09-25 PROBLEM — I25.10 CORONARY ARTERY DISEASE INVOLVING NATIVE CORONARY ARTERY OF NATIVE HEART: Status: ACTIVE | Noted: 2018-09-25

## 2018-10-05 ENCOUNTER — MYAURORA ACCOUNT LINK (OUTPATIENT)
Dept: OTHER | Age: 63
End: 2018-10-05

## 2018-10-05 ENCOUNTER — PRIOR ORIGINAL RECORDS (OUTPATIENT)
Dept: OTHER | Age: 63
End: 2018-10-05

## 2018-11-27 PROBLEM — J43.8 OTHER EMPHYSEMA (HCC): Status: ACTIVE | Noted: 2018-11-27

## 2018-12-26 DIAGNOSIS — R41.89 COGNITIVE CHANGE: ICD-10-CM

## 2018-12-26 RX ORDER — ESCITALOPRAM OXALATE 20 MG/1
TABLET ORAL
Qty: 30 TABLET | Refills: 2 | Status: SHIPPED | OUTPATIENT
Start: 2018-12-26 | End: 2019-03-28

## 2018-12-26 NOTE — TELEPHONE ENCOUNTER
Medication: Escitalopram 20 mg    Date of last refill: 06/18/18 with 5 addt refills  [place]     Last office visit: 05/18/18  Due back to clinic per last office note:  RTN in 6 months  Date next office visit scheduled:

## 2019-02-28 VITALS
RESPIRATION RATE: 16 BRPM | WEIGHT: 213 LBS | SYSTOLIC BLOOD PRESSURE: 164 MMHG | DIASTOLIC BLOOD PRESSURE: 62 MMHG | HEART RATE: 80 BPM

## 2019-02-28 VITALS
WEIGHT: 195 LBS | BODY MASS INDEX: 28.88 KG/M2 | DIASTOLIC BLOOD PRESSURE: 80 MMHG | HEIGHT: 69 IN | SYSTOLIC BLOOD PRESSURE: 154 MMHG | HEART RATE: 80 BPM

## 2019-02-28 VITALS
BODY MASS INDEX: 33.04 KG/M2 | WEIGHT: 218 LBS | HEIGHT: 68 IN | DIASTOLIC BLOOD PRESSURE: 56 MMHG | SYSTOLIC BLOOD PRESSURE: 116 MMHG | HEART RATE: 60 BPM

## 2019-02-28 VITALS
HEART RATE: 68 BPM | WEIGHT: 213 LBS | HEIGHT: 68 IN | BODY MASS INDEX: 32.28 KG/M2 | DIASTOLIC BLOOD PRESSURE: 70 MMHG | SYSTOLIC BLOOD PRESSURE: 150 MMHG

## 2019-02-28 VITALS
DIASTOLIC BLOOD PRESSURE: 52 MMHG | HEIGHT: 68 IN | WEIGHT: 223 LBS | SYSTOLIC BLOOD PRESSURE: 120 MMHG | HEART RATE: 70 BPM | RESPIRATION RATE: 16 BRPM | BODY MASS INDEX: 33.8 KG/M2

## 2019-02-28 VITALS
RESPIRATION RATE: 16 BRPM | DIASTOLIC BLOOD PRESSURE: 56 MMHG | WEIGHT: 215 LBS | HEART RATE: 64 BPM | BODY MASS INDEX: 32.58 KG/M2 | SYSTOLIC BLOOD PRESSURE: 126 MMHG | HEIGHT: 68 IN

## 2019-02-28 VITALS
BODY MASS INDEX: 30.96 KG/M2 | SYSTOLIC BLOOD PRESSURE: 150 MMHG | HEART RATE: 72 BPM | HEIGHT: 69 IN | DIASTOLIC BLOOD PRESSURE: 72 MMHG | WEIGHT: 209 LBS

## 2019-03-25 DIAGNOSIS — R41.89 COGNITIVE CHANGE: ICD-10-CM

## 2019-03-25 RX ORDER — ESCITALOPRAM OXALATE 20 MG/1
TABLET ORAL
Qty: 30 TABLET | Refills: 1 | OUTPATIENT
Start: 2019-03-25

## 2019-04-01 RX ORDER — ESCITALOPRAM OXALATE 20 MG/1
TABLET ORAL
COMMUNITY
Start: 2018-06-01

## 2019-04-01 RX ORDER — CLOPIDOGREL BISULFATE 75 MG/1
1 TABLET ORAL DAILY
COMMUNITY
Start: 2018-06-01 | End: 2019-06-18 | Stop reason: SDUPTHER

## 2019-04-01 RX ORDER — LISINOPRIL 10 MG/1
TABLET ORAL
COMMUNITY
Start: 2019-01-29 | End: 2020-01-29 | Stop reason: SDUPTHER

## 2019-04-01 RX ORDER — DONEPEZIL HYDROCHLORIDE 5 MG/1
TABLET, FILM COATED ORAL
COMMUNITY
Start: 2018-04-04 | End: 2020-06-09

## 2019-04-01 RX ORDER — ATORVASTATIN CALCIUM 80 MG/1
TABLET, FILM COATED ORAL
COMMUNITY
Start: 2018-11-02 | End: 2019-11-12 | Stop reason: SDUPTHER

## 2019-04-01 RX ORDER — BUPROPION HYDROCHLORIDE 150 MG/1
TABLET, FILM COATED, EXTENDED RELEASE ORAL
COMMUNITY
Start: 2018-10-05

## 2019-06-04 ENCOUNTER — OFFICE VISIT (OUTPATIENT)
Dept: CARDIOLOGY | Age: 64
End: 2019-06-04

## 2019-06-04 VITALS
WEIGHT: 230 LBS | RESPIRATION RATE: 16 BRPM | HEIGHT: 69 IN | DIASTOLIC BLOOD PRESSURE: 60 MMHG | HEART RATE: 68 BPM | BODY MASS INDEX: 34.07 KG/M2 | SYSTOLIC BLOOD PRESSURE: 130 MMHG

## 2019-06-04 DIAGNOSIS — Z95.5 S/P PRIMARY ANGIOPLASTY WITH CORONARY STENT: ICD-10-CM

## 2019-06-04 DIAGNOSIS — I65.21 STENOSIS OF RIGHT CAROTID ARTERY: Primary | ICD-10-CM

## 2019-06-04 DIAGNOSIS — I10 HYPERTENSION, BENIGN: ICD-10-CM

## 2019-06-04 DIAGNOSIS — E78.00 PURE HYPERCHOLESTEROLEMIA: ICD-10-CM

## 2019-06-04 DIAGNOSIS — I25.10 CORONARY ARTERY DISEASE INVOLVING NATIVE CORONARY ARTERY OF NATIVE HEART WITHOUT ANGINA PECTORIS: ICD-10-CM

## 2019-06-04 PROCEDURE — 99215 OFFICE O/P EST HI 40 MIN: CPT | Performed by: INTERNAL MEDICINE

## 2019-06-04 SDOH — HEALTH STABILITY: PHYSICAL HEALTH: ON AVERAGE, HOW MANY DAYS PER WEEK DO YOU ENGAGE IN MODERATE TO STRENUOUS EXERCISE (LIKE A BRISK WALK)?: 0 DAYS

## 2019-06-04 ASSESSMENT — ENCOUNTER SYMPTOMS
ALLERGIC/IMMUNOLOGIC COMMENTS: NO NEW FOOD ALLERGIES
HEMATOCHEZIA: 0
BRUISES/BLEEDS EASILY: 0
HEMOPTYSIS: 0
COUGH: 0
FEVER: 0
CHILLS: 0
WEIGHT GAIN: 0
SUSPICIOUS LESIONS: 0
WEIGHT LOSS: 0

## 2019-06-06 PROBLEM — R53.1 ACUTE LEFT-SIDED WEAKNESS: Status: RESOLVED | Noted: 2017-09-02 | Resolved: 2019-06-06

## 2019-06-06 PROBLEM — M51.16 DISPLACEMENT OF LUMBAR INTERVERTEBRAL DISC WITH RADICULOPATHY: Status: RESOLVED | Noted: 2018-08-08 | Resolved: 2019-06-06

## 2019-06-18 RX ORDER — CLOPIDOGREL BISULFATE 75 MG/1
75 TABLET ORAL DAILY
Qty: 90 TABLET | Refills: 3 | Status: SHIPPED | OUTPATIENT
Start: 2019-06-18 | End: 2019-09-16

## 2019-08-30 ENCOUNTER — TELEPHONE (OUTPATIENT)
Dept: CARDIOLOGY | Age: 64
End: 2019-08-30

## 2019-09-06 ENCOUNTER — TELEPHONE (OUTPATIENT)
Dept: CARDIOLOGY | Age: 64
End: 2019-09-06

## 2019-09-25 PROBLEM — S06.0X0A CONCUSSION WITHOUT LOSS OF CONSCIOUSNESS, INITIAL ENCOUNTER: Status: ACTIVE | Noted: 2019-09-25
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
## 2019-09-25 PROBLEM — S40.022A ARM CONTUSION, LEFT, INITIAL ENCOUNTER: Status: ACTIVE | Noted: 2019-09-25

## 2019-10-02 ENCOUNTER — ORDER TRANSCRIPTION (OUTPATIENT)
Dept: PHYSICAL THERAPY | Facility: HOSPITAL | Age: 64
End: 2019-10-02

## 2019-10-02 DIAGNOSIS — R41.3 MEMORY DISTURBANCE: Primary | ICD-10-CM

## 2019-10-02 DIAGNOSIS — G47.9 SLEEP DISTURBANCE: ICD-10-CM

## 2019-10-02 DIAGNOSIS — R48.0 DYSLEXIA: ICD-10-CM

## 2019-10-17 ENCOUNTER — HOSPITAL ENCOUNTER (OUTPATIENT)
Dept: SPEECH THERAPY | Facility: HOSPITAL | Age: 64
Setting detail: THERAPIES SERIES
Discharge: HOME | End: 2019-10-17
Attending: Other
Payer: MEDICARE

## 2019-10-17 PROCEDURE — 92523 SPEECH SOUND LANG COMPREHEN: CPT

## 2019-10-17 NOTE — PROGRESS NOTES
ADULT SPEECH/LANGUAGE EVALUATION:   Referring Physician: Dr. Laney Spann  Diagnosis: Memory disturbance (R41.3); Sleep disturbance (G47.9); Dyslexia (R48.0)     Date of Service: 10/17/2019   Speech-language evaluation completed per MD order.   Patient arrived Diagnosis: Cognitive-Communication Deficit (R41.841)  Patient presented to [facility] outpatient speech therapy department for a speech-language evaluation with complaints regarding communication and memory with onset approximately ten months ago.    Problem solving  Comments: See below for assessments administered. Administered the Cognitive Linguistic Quick Test (CLQT). CLQT scores are as follows:     Composite Cognitive Score (ie: Overall Cognition): 2.2, moderate deficit (WNL=3.5-4.0).   Domain Sc given min verbal/visual cues to develop use of compensatory strategies for improved memory/recall (3 months).   STG 5: Patient will recall 4/5 unrelated items after 5-10 minute delay with use of compensatory strategies given min verbal/visual cues (3 months care.    X___________________________________________________ Date____________________    Certification From: 08/71/8063  To:1/15/2020

## 2019-10-24 ENCOUNTER — HOSPITAL ENCOUNTER (OUTPATIENT)
Dept: SPEECH THERAPY | Facility: HOSPITAL | Age: 64
Setting detail: THERAPIES SERIES
Discharge: HOME | End: 2019-10-24
Attending: Other
Payer: MEDICARE

## 2019-10-24 PROCEDURE — 92507 TX SP LANG VOICE COMM INDIV: CPT

## 2019-10-24 NOTE — PROGRESS NOTES
Treatment #2 (Medicare 2/10; POC thru 1/15/20)  Treatment Time: 60 minutes  Precautions: none     Charges: 1 billed ([id number])  Pain: 0/10      Diagnosis: Memory disturbance (R41.3); Sleep disturbance (G47.9);  Dyslexia (R48.0)          Subjective: Patient arr time concepts (eg: If you go to bed at 9pm and wake at 5:30am, how much did you sleep?) with 100% accuracy independently.   Patient completed functional math problems for money concepts (eg: If you buy 4 entrees at $9 per plate, what is your total?) with 80 focus on other goals. STG 8: Patient will produce an average of 3/3 patients () for stated verbs given min verbal/visual cues and appropriate processing time to facilitate lexical retrieval and sentence production (3 months).   Not addressed due

## 2019-10-31 ENCOUNTER — HOSPITAL ENCOUNTER (OUTPATIENT)
Dept: SPEECH THERAPY | Facility: HOSPITAL | Age: 64
Setting detail: THERAPIES SERIES
Discharge: HOME | End: 2019-10-31
Attending: Other
Payer: MEDICARE

## 2019-10-31 PROCEDURE — 92507 TX SP LANG VOICE COMM INDIV: CPT

## 2019-10-31 NOTE — PROGRESS NOTES
Treatment #3 (Medicare 3/10; POC thru 1/15/20)  Treatment Time: 60 minutes  Precautions: none     Charges: 1 billed ([id number])  Pain: 0/10      Diagnosis: Memory disturbance (R41.3); Sleep disturbance (G47.9);  Dyslexia (R48.0)          Subjective: Patient arr verbs given min verbal/visual cues and appropriate processing time to facilitate lexical retrieval and sentence production (3 months). Not addressed due to tardiness.     STG 8: Patient will produce an average of 3/3 patients () for stated verbs gi

## 2019-11-07 ENCOUNTER — HOSPITAL ENCOUNTER (OUTPATIENT)
Dept: SPEECH THERAPY | Facility: HOSPITAL | Age: 64
Setting detail: THERAPIES SERIES
Discharge: HOME | End: 2019-11-07
Attending: Other
Payer: MEDICARE

## 2019-11-07 PROCEDURE — 92507 TX SP LANG VOICE COMM INDIV: CPT

## 2019-11-07 NOTE — PROGRESS NOTES
Treatment #4 (Medicare 4/10; POC thru 1/15/20)  Treatment Time: 60 minutes  Precautions: none     Charges: 1 billed ([id number])  Pain: 0/10      Diagnosis: Memory disturbance (R41.3); Sleep disturbance (G47.9);  Dyslexia (R48.0)          Subjective: Patient arr months). Not addressed due to tardiness. STG 6: Patient will complete executive function tasks with 80% accuracy given min verbal/visual cues to facilitate attention and problem-solving/reasoning skills (3 months). Not addressed due to tardiness.     Torey Rouse

## 2019-11-12 ENCOUNTER — TELEPHONE (OUTPATIENT)
Dept: CARDIOLOGY | Age: 64
End: 2019-11-12

## 2019-11-12 RX ORDER — ATORVASTATIN CALCIUM 80 MG/1
80 TABLET, FILM COATED ORAL AT BEDTIME
Qty: 90 TABLET | Refills: 2 | Status: SHIPPED | OUTPATIENT
Start: 2019-11-12 | End: 2020-08-04

## 2019-11-14 ENCOUNTER — HOSPITAL ENCOUNTER (OUTPATIENT)
Dept: SPEECH THERAPY | Facility: HOSPITAL | Age: 64
Setting detail: THERAPIES SERIES
Discharge: HOME | End: 2019-11-14
Attending: Other
Payer: MEDICARE

## 2019-11-14 PROCEDURE — 92507 TX SP LANG VOICE COMM INDIV: CPT

## 2019-11-14 NOTE — PROGRESS NOTES
Treatment #5 (Medicare 5/10; POC thru 1/15/20)  Treatment Time: 60 minutes  Precautions: none     Charges: 1 billed ([id number])  Pain: 0/10      Diagnosis: Memory disturbance (R41.3); Sleep disturbance (G47.9);  Dyslexia (R48.0)            Subjective: Patient a appropriate processing time to facilitate lexical retrieval and sentence production (3 months). Patient produced an average of 3/3 agents independently for stated verbs (ride, weigh).     STG 8: Patient will produce an average of 3/3 patients () fo

## 2019-11-21 ENCOUNTER — HOSPITAL ENCOUNTER (OUTPATIENT)
Dept: SPEECH THERAPY | Facility: HOSPITAL | Age: 64
Setting detail: THERAPIES SERIES
Discharge: HOME | End: 2019-11-21
Attending: INTERNAL MEDICINE
Payer: MEDICARE

## 2019-11-21 PROCEDURE — 92507 TX SP LANG VOICE COMM INDIV: CPT

## 2019-11-21 NOTE — PROGRESS NOTES
Treatment #6 (Medicare 6/10; POC thru 1/15/20)  Treatment Time: 60 minutes  Precautions: none     Charges: 1 billed ([id number])  Pain: 0/10      Diagnosis: Memory disturbance (R41.3); Sleep disturbance (G47.9);  Dyslexia (R48.0)            Subjective: Patient a given min verbal/visual cues and appropriate processing time to facilitate lexical retrieval and sentence production (3 months). Not addressed due to focus on other goals.     STG 8: Patient will produce an average of 3/3 patients () for stated misael

## 2019-12-13 ENCOUNTER — HOSPITAL ENCOUNTER (OUTPATIENT)
Dept: SPEECH THERAPY | Facility: HOSPITAL | Age: 64
Setting detail: THERAPIES SERIES
Discharge: HOME | End: 2019-12-13
Attending: Other
Payer: MEDICARE

## 2019-12-13 PROCEDURE — 92507 TX SP LANG VOICE COMM INDIV: CPT

## 2019-12-13 NOTE — PROGRESS NOTES
Treatment #7 (Medicare 7/10; POC thru 1/15/20)  Treatment Time: 60 minutes  Precautions: none     Charges: 1 billed ([id number])  Pain: 0/10      Diagnosis: Memory disturbance (R41.3); Sleep disturbance (G47.9);  Dyslexia (R48.0)            Subjective: Patient a for stated verbs given min verbal/visual cues and appropriate processing time to facilitate lexical retrieval and sentence production (3 months). Patient independently produced an average of 3/3 agents for stated verb (cut).     STG 8: Patient will produce

## 2019-12-26 ENCOUNTER — HOSPITAL ENCOUNTER (OUTPATIENT)
Dept: SPEECH THERAPY | Facility: HOSPITAL | Age: 64
Setting detail: THERAPIES SERIES
Discharge: HOME | End: 2019-12-26
Attending: INTERNAL MEDICINE
Payer: MEDICARE

## 2019-12-26 PROCEDURE — 92507 TX SP LANG VOICE COMM INDIV: CPT

## 2019-12-26 NOTE — PROGRESS NOTES
Treatment #8 (Medicare 8/10; POC thru 1/15/20)  Treatment Time: 60 minutes  Precautions: none     Charges: 1 billed ([id number])  Pain: 0/10      Diagnosis: Memory disturbance (R41.3); Sleep disturbance (G47.9);  Dyslexia (R48.0)      Discharge Summary    Dear D understanding of compensatory strategies addressing cognitive-communication skills to facilitate participation in IADLs (3 months). Discharge Status: GOAL MET. Patient verbalizes understanding of strategies and demonstrates use during session.      STG verbal/visual cues and appropriate processing time to facilitate lexical retrieval and sentence production (3 months). Discharge Status: GOAL MET. Patient independently produces an average of 3/3 patients for stated verb (cut).      STG 9: Patient will an

## 2020-01-03 ENCOUNTER — APPOINTMENT (OUTPATIENT)
Dept: SPEECH THERAPY | Facility: HOSPITAL | Age: 65
End: 2020-01-03
Payer: MEDICARE

## 2020-01-29 RX ORDER — LISINOPRIL 10 MG/1
10 TABLET ORAL DAILY
Qty: 90 TABLET | Refills: 3 | Status: SHIPPED | OUTPATIENT
Start: 2020-01-29 | End: 2021-01-11 | Stop reason: SDUPTHER

## 2020-02-15 NOTE — PROGRESS NOTES
HPI:    Patient ID: Indy Kendall is a 57year old female. HPI  Patient is a 57 year female here for follow-up from [facility] for TIA and right carotid endarterectomy vein patch.   Patient presented to the ER with transient recurrent left arm/le Prescriptions:  atorvastatin 40 MG Oral Tab Take 40 mg by mouth nightly. Disp:  Rfl:    escitalopram (LEXAPRO) 5 MG Oral Tab Take 1 tab po daily Disp: 30 tablet Rfl: 2   Clopidogrel Bisulfate 75 MG Oral Tab Take 1 tablet (75 mg total) by mouth daily.  Disp: loss  Anxiety and depression    TIa- continue ASA and plavix. Continue to control risk factors including blood pressure, cholesterol and blood sugars. PT ordered for strengthening and increased endurance. Carotid endarterectomy- following with Dr. Lily Jones. 180

## 2020-03-11 PROBLEM — I72.9 ANEURYSM (HCC): Status: ACTIVE | Noted: 2020-03-11

## 2020-05-06 ENCOUNTER — TELEPHONE (OUTPATIENT)
Dept: CARDIOLOGY | Age: 65
End: 2020-05-06

## 2020-05-23 NOTE — PLAN OF CARE
Impaired Swallowing    • Minimize aspiration risk Adequate for Discharge          Impaired Functional Mobility    • Achieve highest/safest level of mobility/gait Progressing        NEUROLOGICAL - ADULT    • Achieves maximal functionality and self care Prog Clothing/percings/Other belongings

## 2020-06-05 RX ORDER — GABAPENTIN 300 MG/1
300 CAPSULE ORAL
COMMUNITY
Start: 2020-03-03 | End: 2022-02-09

## 2020-06-05 RX ORDER — CLOPIDOGREL BISULFATE 75 MG/1
75 TABLET ORAL DAILY
COMMUNITY
Start: 2020-03-13 | End: 2020-06-08

## 2020-06-05 RX ORDER — VALACYCLOVIR HYDROCHLORIDE 500 MG/1
TABLET, FILM COATED ORAL
COMMUNITY
Start: 2020-04-06 | End: 2021-02-16

## 2020-06-05 RX ORDER — RIVASTIGMINE 4.6 MG/24H
PATCH, EXTENDED RELEASE TRANSDERMAL
COMMUNITY
Start: 2020-02-10 | End: 2021-02-16

## 2020-06-05 RX ORDER — OMEPRAZOLE 20 MG/1
20 CAPSULE, DELAYED RELEASE ORAL DAILY
COMMUNITY
Start: 2020-04-15

## 2020-06-08 RX ORDER — CLOPIDOGREL BISULFATE 75 MG/1
TABLET ORAL
Qty: 90 TABLET | Refills: 3 | Status: SHIPPED | OUTPATIENT
Start: 2020-06-08 | End: 2022-02-09 | Stop reason: SDUPTHER

## 2020-06-09 ENCOUNTER — OFFICE VISIT (OUTPATIENT)
Dept: CARDIOLOGY | Age: 65
End: 2020-06-09

## 2020-06-09 VITALS — WEIGHT: 240 LBS | HEART RATE: 50 BPM | BODY MASS INDEX: 35.44 KG/M2 | OXYGEN SATURATION: 90 %

## 2020-06-09 DIAGNOSIS — Z95.5 S/P PRIMARY ANGIOPLASTY WITH CORONARY STENT: ICD-10-CM

## 2020-06-09 DIAGNOSIS — I10 HYPERTENSION, BENIGN: ICD-10-CM

## 2020-06-09 DIAGNOSIS — I65.21 STENOSIS OF RIGHT CAROTID ARTERY: Primary | ICD-10-CM

## 2020-06-09 DIAGNOSIS — I25.10 CORONARY ARTERY DISEASE INVOLVING NATIVE CORONARY ARTERY OF NATIVE HEART WITHOUT ANGINA PECTORIS: ICD-10-CM

## 2020-06-09 DIAGNOSIS — E78.00 PURE HYPERCHOLESTEROLEMIA: ICD-10-CM

## 2020-06-09 PROCEDURE — 99442 TELEPHONE E&M BY PHYSICIAN EST PT NOT ORIG PREV 7 DAYS 11-20 MIN: CPT | Performed by: INTERNAL MEDICINE

## 2020-06-09 ASSESSMENT — ENCOUNTER SYMPTOMS
SHORTNESS OF BREATH: 1
BRUISES/BLEEDS EASILY: 0
CHILLS: 0
SUSPICIOUS LESIONS: 0
HEMATOCHEZIA: 0
WEIGHT LOSS: 0
HEMOPTYSIS: 0
WEIGHT GAIN: 0
COUGH: 0
FEVER: 0
ALLERGIC/IMMUNOLOGIC COMMENTS: NO NEW FOOD ALLERGIES

## 2020-06-09 ASSESSMENT — PATIENT HEALTH QUESTIONNAIRE - PHQ9
1. LITTLE INTEREST OR PLEASURE IN DOING THINGS: SEVERAL DAYS
SUM OF ALL RESPONSES TO PHQ9 QUESTIONS 1 AND 2: 2
SUM OF ALL RESPONSES TO PHQ9 QUESTIONS 1 AND 2: 2
CLINICAL INTERPRETATION OF PHQ9 SCORE: NO FURTHER SCREENING NEEDED
2. FEELING DOWN, DEPRESSED OR HOPELESS: SEVERAL DAYS
CLINICAL INTERPRETATION OF PHQ2 SCORE: NO FURTHER SCREENING NEEDED

## 2020-06-10 ENCOUNTER — TELEPHONE (OUTPATIENT)
Dept: PEDIATRIC ENDOCRINOLOGY | Age: 65
End: 2020-06-10

## 2020-06-17 ENCOUNTER — TELEPHONE (OUTPATIENT)
Dept: CARDIOLOGY | Age: 65
End: 2020-06-17

## 2020-07-03 ENCOUNTER — LAB SERVICES (OUTPATIENT)
Dept: LAB | Age: 65
End: 2020-07-03

## 2020-07-03 DIAGNOSIS — E78.00 PURE HYPERCHOLESTEROLEMIA: ICD-10-CM

## 2020-07-03 LAB
ALT SERPL-CCNC: 29 UNITS/L
AST SERPL-CCNC: 15 UNITS/L
CHOLEST SERPL-MCNC: 131 MG/DL
CHOLEST/HDLC SERPL: 3 {RATIO}
HDLC SERPL-MCNC: 43 MG/DL
LDLC SERPL CALC-MCNC: 60 MG/DL
LENGTH OF FAST TIME PATIENT: 12 HRS
NONHDLC SERPL-MCNC: 88 MG/DL
TRIGL SERPL-MCNC: 139 MG/DL

## 2020-07-06 ENCOUNTER — TELEPHONE (OUTPATIENT)
Dept: CARDIOLOGY | Age: 65
End: 2020-07-06

## 2020-08-04 RX ORDER — ATORVASTATIN CALCIUM 80 MG/1
TABLET, FILM COATED ORAL
Qty: 90 TABLET | Refills: 3 | Status: SHIPPED | OUTPATIENT
Start: 2020-08-04 | End: 2021-08-12

## 2020-12-15 ENCOUNTER — OFFICE VISIT (OUTPATIENT)
Dept: CARDIOLOGY | Age: 65
End: 2020-12-15

## 2020-12-15 ENCOUNTER — TELEPHONE (OUTPATIENT)
Dept: CARDIOLOGY | Age: 65
End: 2020-12-15

## 2020-12-15 VITALS
SYSTOLIC BLOOD PRESSURE: 130 MMHG | HEART RATE: 62 BPM | BODY MASS INDEX: 34.71 KG/M2 | HEIGHT: 68 IN | WEIGHT: 229 LBS | DIASTOLIC BLOOD PRESSURE: 70 MMHG

## 2020-12-15 DIAGNOSIS — E78.00 PURE HYPERCHOLESTEROLEMIA: ICD-10-CM

## 2020-12-15 DIAGNOSIS — I10 HYPERTENSION, BENIGN: ICD-10-CM

## 2020-12-15 DIAGNOSIS — Z95.5 S/P PRIMARY ANGIOPLASTY WITH CORONARY STENT: ICD-10-CM

## 2020-12-15 DIAGNOSIS — R53.83 MALAISE AND FATIGUE: ICD-10-CM

## 2020-12-15 DIAGNOSIS — I65.23 BILATERAL CAROTID ARTERY STENOSIS: ICD-10-CM

## 2020-12-15 DIAGNOSIS — I25.10 CORONARY ARTERY DISEASE INVOLVING NATIVE CORONARY ARTERY OF NATIVE HEART WITHOUT ANGINA PECTORIS: ICD-10-CM

## 2020-12-15 DIAGNOSIS — R53.81 MALAISE AND FATIGUE: ICD-10-CM

## 2020-12-15 DIAGNOSIS — I65.21 STENOSIS OF RIGHT CAROTID ARTERY: Primary | ICD-10-CM

## 2020-12-15 PROCEDURE — 99215 OFFICE O/P EST HI 40 MIN: CPT | Performed by: INTERNAL MEDICINE

## 2020-12-15 RX ORDER — GABAPENTIN 100 MG/1
100 CAPSULE ORAL DAILY
COMMUNITY
End: 2021-02-16

## 2020-12-15 SDOH — HEALTH STABILITY: PHYSICAL HEALTH: ON AVERAGE, HOW MANY DAYS PER WEEK DO YOU ENGAGE IN MODERATE TO STRENUOUS EXERCISE (LIKE A BRISK WALK)?: 0 DAYS

## 2020-12-15 ASSESSMENT — ENCOUNTER SYMPTOMS
CHILLS: 0
FEVER: 0
BRUISES/BLEEDS EASILY: 0
WEIGHT GAIN: 0
WEIGHT LOSS: 0
SUSPICIOUS LESIONS: 0
HEMATOCHEZIA: 0
COUGH: 0
HEMOPTYSIS: 0
ALLERGIC/IMMUNOLOGIC COMMENTS: NO NEW FOOD ALLERGIES

## 2021-01-11 RX ORDER — LISINOPRIL 10 MG/1
10 TABLET ORAL DAILY
Qty: 30 TABLET | Refills: 0 | Status: SHIPPED | OUTPATIENT
Start: 2021-01-11 | End: 2022-02-09 | Stop reason: SDUPTHER

## 2021-01-14 ENCOUNTER — TELEPHONE (OUTPATIENT)
Dept: CARDIOLOGY | Age: 66
End: 2021-01-14

## 2021-01-14 ENCOUNTER — ANCILLARY PROCEDURE (OUTPATIENT)
Dept: CARDIOLOGY | Age: 66
End: 2021-01-14
Attending: INTERNAL MEDICINE

## 2021-01-14 VITALS — BODY MASS INDEX: 34.71 KG/M2 | HEIGHT: 68 IN | WEIGHT: 229 LBS

## 2021-01-14 DIAGNOSIS — E78.00 PURE HYPERCHOLESTEROLEMIA: ICD-10-CM

## 2021-01-14 DIAGNOSIS — R53.83 MALAISE AND FATIGUE: ICD-10-CM

## 2021-01-14 DIAGNOSIS — I10 HYPERTENSION, BENIGN: Primary | ICD-10-CM

## 2021-01-14 DIAGNOSIS — R53.81 MALAISE AND FATIGUE: ICD-10-CM

## 2021-01-14 DIAGNOSIS — Z95.5 S/P PRIMARY ANGIOPLASTY WITH CORONARY STENT: ICD-10-CM

## 2021-01-14 DIAGNOSIS — I25.10 CORONARY ARTERY DISEASE INVOLVING NATIVE CORONARY ARTERY OF NATIVE HEART WITHOUT ANGINA PECTORIS: ICD-10-CM

## 2021-01-14 PROCEDURE — X1094 NO CHARGE VISIT: HCPCS

## 2021-01-14 PROCEDURE — 93015 CV STRESS TEST SUPVJ I&R: CPT | Performed by: INTERNAL MEDICINE

## 2021-01-14 PROCEDURE — 93880 EXTRACRANIAL BILAT STUDY: CPT | Performed by: INTERNAL MEDICINE

## 2021-01-14 PROCEDURE — A9502 TC99M TETROFOSMIN: HCPCS | Performed by: INTERNAL MEDICINE

## 2021-01-14 PROCEDURE — X1094 NO CHARGE VISIT: HCPCS | Performed by: INTERNAL MEDICINE

## 2021-01-14 PROCEDURE — 78452 HT MUSCLE IMAGE SPECT MULT: CPT | Performed by: INTERNAL MEDICINE

## 2021-01-14 RX ORDER — REGADENOSON 0.08 MG/ML
0.4 INJECTION, SOLUTION INTRAVENOUS ONCE
Status: COMPLETED | OUTPATIENT
Start: 2021-01-14 | End: 2021-01-14

## 2021-01-14 RX ADMIN — REGADENOSON 0.4 MG: 0.08 INJECTION, SOLUTION INTRAVENOUS at 13:35

## 2021-01-14 ASSESSMENT — EXERCISE STRESS TEST
STAGE_CATEGORIES: RESTING
STAGE_CATEGORIES: 1
PEAK_HR: 56
PEAK_BP: 104/68
PEAK_BP: 104/64
COMMENTS: REGADENOSON INJECTED
PEAK_HR: 46
PEAK_RPP: 4400
PEAK_RPP: 4784
PEAK_RPP: 5824
STAGE_CATEGORIES: RECOVERY 1
PEAK_BP: 100/58
STAGE_CATEGORIES: RECOVERY 2
PEAK_BP: 100/58
PEAK_HR: 56
STOPPAGE_REASON: PROTOCOL COMPLETE
STAGE_CATEGORIES: RECOVERY 0
PEAK_BP: 104/62
PEAK_RPP: 5824
PEAK_RPP: 4400
PEAK_HR: 44
PEAK_HR: 44

## 2021-01-15 ENCOUNTER — TELEPHONE (OUTPATIENT)
Dept: CARDIOLOGY | Age: 66
End: 2021-01-15

## 2021-01-15 LAB
HEART RATE RESERVE PREDICTED: 71.24 BPM
LV EF: 62 %
PEAK HR ACHIEVED: 44 BPM
RESTING HR ACHIEVED: 46 BPM
STRESS BASELINE BP: NORMAL MMHG
STRESS PERCENT HR: 29 %
STRESS POST EXERCISE DUR MIN: 1 MIN
STRESS POST PEAK BP: NORMAL MMHG
STRESS TARGET HR: 153 BPM

## 2021-02-01 PROBLEM — F33.41 RECURRENT MAJOR DEPRESSIVE DISORDER, IN PARTIAL REMISSION (HCC): Status: ACTIVE | Noted: 2021-02-01

## 2021-02-09 DIAGNOSIS — Z23 NEED FOR VACCINATION: ICD-10-CM

## 2021-02-16 ENCOUNTER — OFFICE VISIT (OUTPATIENT)
Dept: CARDIOLOGY | Age: 66
End: 2021-02-16

## 2021-02-16 VITALS
SYSTOLIC BLOOD PRESSURE: 140 MMHG | WEIGHT: 228 LBS | HEART RATE: 68 BPM | HEIGHT: 68 IN | DIASTOLIC BLOOD PRESSURE: 70 MMHG | BODY MASS INDEX: 34.56 KG/M2

## 2021-02-16 DIAGNOSIS — Z95.5 S/P PRIMARY ANGIOPLASTY WITH CORONARY STENT: ICD-10-CM

## 2021-02-16 DIAGNOSIS — I25.10 CORONARY ARTERY DISEASE INVOLVING NATIVE CORONARY ARTERY OF NATIVE HEART WITHOUT ANGINA PECTORIS: ICD-10-CM

## 2021-02-16 DIAGNOSIS — I10 HYPERTENSION, BENIGN: Primary | ICD-10-CM

## 2021-02-16 DIAGNOSIS — E78.00 PURE HYPERCHOLESTEROLEMIA: ICD-10-CM

## 2021-02-16 DIAGNOSIS — I65.23 BILATERAL CAROTID ARTERY STENOSIS: ICD-10-CM

## 2021-02-16 PROCEDURE — 99215 OFFICE O/P EST HI 40 MIN: CPT | Performed by: INTERNAL MEDICINE

## 2021-02-16 SDOH — HEALTH STABILITY: PHYSICAL HEALTH: ON AVERAGE, HOW MANY MINUTES DO YOU ENGAGE IN EXERCISE AT THIS LEVEL?: 0 MIN

## 2021-02-16 SDOH — HEALTH STABILITY: PHYSICAL HEALTH: ON AVERAGE, HOW MANY DAYS PER WEEK DO YOU ENGAGE IN MODERATE TO STRENUOUS EXERCISE (LIKE A BRISK WALK)?: 0 DAYS

## 2021-02-16 ASSESSMENT — ENCOUNTER SYMPTOMS
SUSPICIOUS LESIONS: 0
COUGH: 0
FEVER: 0
HEMOPTYSIS: 0
BRUISES/BLEEDS EASILY: 0
CHILLS: 0
HEMATOCHEZIA: 0
WEIGHT LOSS: 0
WEIGHT GAIN: 0
ALLERGIC/IMMUNOLOGIC COMMENTS: NO NEW FOOD ALLERGIES

## 2021-02-16 ASSESSMENT — PATIENT HEALTH QUESTIONNAIRE - PHQ9
2. FEELING DOWN, DEPRESSED OR HOPELESS: NOT AT ALL
SUM OF ALL RESPONSES TO PHQ9 QUESTIONS 1 AND 2: 0
SUM OF ALL RESPONSES TO PHQ9 QUESTIONS 1 AND 2: 0
CLINICAL INTERPRETATION OF PHQ2 SCORE: NO FURTHER SCREENING NEEDED
CLINICAL INTERPRETATION OF PHQ9 SCORE: NO FURTHER SCREENING NEEDED
1. LITTLE INTEREST OR PLEASURE IN DOING THINGS: NOT AT ALL

## 2021-08-12 RX ORDER — ATORVASTATIN CALCIUM 80 MG/1
TABLET, FILM COATED ORAL
Qty: 90 TABLET | Refills: 2 | Status: SHIPPED | OUTPATIENT
Start: 2021-08-12 | End: 2022-04-11

## 2022-01-04 DIAGNOSIS — J44.9 STAGE 2 MODERATE COPD BY GOLD CLASSIFICATION (CMD): Primary | ICD-10-CM

## 2022-01-07 ENCOUNTER — APPOINTMENT (OUTPATIENT)
Dept: PULMONOLOGY | Age: 67
End: 2022-01-07
Attending: INTERNAL MEDICINE

## 2022-01-11 ENCOUNTER — APPOINTMENT (OUTPATIENT)
Dept: PULMONOLOGY | Age: 67
End: 2022-01-11
Attending: INTERNAL MEDICINE

## 2022-02-08 ASSESSMENT — PATIENT HEALTH QUESTIONNAIRE - PHQ9
SUM OF ALL RESPONSES TO PHQ9 QUESTIONS 1 AND 2: 0
2. FEELING DOWN, DEPRESSED OR HOPELESS: NOT AT ALL
CLINICAL INTERPRETATION OF PHQ2 SCORE: NO FURTHER SCREENING NEEDED
SUM OF ALL RESPONSES TO PHQ9 QUESTIONS 1 AND 2: 0
1. LITTLE INTEREST OR PLEASURE IN DOING THINGS: NOT AT ALL

## 2022-02-09 ENCOUNTER — OFFICE VISIT (OUTPATIENT)
Dept: CARDIOLOGY | Age: 67
End: 2022-02-09

## 2022-02-09 VITALS
WEIGHT: 225 LBS | DIASTOLIC BLOOD PRESSURE: 60 MMHG | SYSTOLIC BLOOD PRESSURE: 120 MMHG | HEIGHT: 68 IN | RESPIRATION RATE: 16 BRPM | HEART RATE: 60 BPM | BODY MASS INDEX: 34.1 KG/M2

## 2022-02-09 DIAGNOSIS — I10 HYPERTENSION, BENIGN: ICD-10-CM

## 2022-02-09 DIAGNOSIS — E78.00 PURE HYPERCHOLESTEROLEMIA: ICD-10-CM

## 2022-02-09 DIAGNOSIS — Z95.5 S/P PRIMARY ANGIOPLASTY WITH CORONARY STENT: Primary | ICD-10-CM

## 2022-02-09 DIAGNOSIS — I65.23 BILATERAL CAROTID ARTERY STENOSIS: ICD-10-CM

## 2022-02-09 DIAGNOSIS — I25.10 CORONARY ARTERY DISEASE INVOLVING NATIVE CORONARY ARTERY OF NATIVE HEART WITHOUT ANGINA PECTORIS: ICD-10-CM

## 2022-02-09 PROCEDURE — 99215 OFFICE O/P EST HI 40 MIN: CPT | Performed by: INTERNAL MEDICINE

## 2022-02-09 RX ORDER — MEMANTINE HYDROCHLORIDE 5 MG/1
5 TABLET ORAL 2 TIMES DAILY
COMMUNITY

## 2022-02-09 RX ORDER — CLOPIDOGREL BISULFATE 75 MG/1
75 TABLET ORAL DAILY
Qty: 90 TABLET | Refills: 3 | Status: SHIPPED | OUTPATIENT
Start: 2022-02-09 | End: 2023-02-08 | Stop reason: SDUPTHER

## 2022-02-09 RX ORDER — LISINOPRIL 10 MG/1
10 TABLET ORAL DAILY
Qty: 90 TABLET | Refills: 3 | Status: SHIPPED | OUTPATIENT
Start: 2022-02-09 | End: 2023-02-08 | Stop reason: SDUPTHER

## 2022-02-11 ENCOUNTER — HOSPITAL ENCOUNTER (OUTPATIENT)
Dept: PULMONOLOGY | Age: 67
Discharge: STILL ADMITTED OR ADMINISTRATIVE | End: 2022-02-11
Attending: INTERNAL MEDICINE

## 2022-02-11 VITALS — HEIGHT: 68 IN | BODY MASS INDEX: 34.21 KG/M2

## 2022-02-11 DIAGNOSIS — J44.9 STAGE 2 MODERATE COPD BY GOLD CLASSIFICATION (CMD): ICD-10-CM

## 2022-02-11 PROCEDURE — 94626 PHY/QHP OP PULM RHB W/MNTR: CPT

## 2022-02-11 PROCEDURE — 94625 PHY/QHP OP PULM RHB W/O MNTR: CPT

## 2022-02-11 ASSESSMENT — PATIENT HEALTH QUESTIONNAIRE - PHQ9
1. LITTLE INTEREST OR PLEASURE IN DOING THINGS: NOT AT ALL
2. FEELING DOWN, DEPRESSED OR HOPELESS: NOT AT ALL
5. POOR APPETITE OR OVEREATING: NEARLY EVERY DAY
SUM OF ALL RESPONSES TO PHQ QUESTIONS 1-9: 6
9. THOUGHTS THAT YOU WOULD BE BETTER OFF DEAD, OR OF HURTING YOURSELF: NOT AT ALL
8. MOVING OR SPEAKING SO SLOWLY THAT OTHER PEOPLE COULD HAVE NOTICED. OR THE OPPOSITE, BEING SO FIGETY OR RESTLESS THAT YOU HAVE BEEN MOVING AROUND A LOT MORE THAN USUAL: NOT AT ALL
6. FEELING BAD ABOUT YOURSELF - OR THAT YOU ARE A FAILURE OR HAVE LET YOURSELF OR YOUR FAMILY DOWN: NOT AT ALL
4. FEELING TIRED OR HAVING LITTLE ENERGY: NOT AT ALL
3. TROUBLE FALLING OR STAYING ASLEEP OR SLEEPING TOO MUCH: NOT AT ALL
10. IF YOU CHECKED OFF ANY PROBLEMS, HOW DIFFICULT HAVE THESE PROBLEMS MADE IT FOR YOU TO DO YOUR WORK, TAKE CARE OF THINGS AT HOME, OR GET ALONG WITH OTHER PEOPLE: SOMEWHAT DIFFICULT
7. TROUBLE CONCENTRATING ON THINGS, SUCH AS READING THE NEWSPAPER OR WATCHING TELEVISION: NEARLY EVERY DAY

## 2022-02-11 ASSESSMENT — LIFESTYLE VARIABLES
PACKS_PER_DAY: 0.5
SMOKING_YEARS: 53
SMOKING_TYPE: CIGARETTES

## 2022-02-11 ASSESSMENT — PULMONARY FUNCTION TESTS
FVC: 64
FEV1/FVC: 66
FEV1: 55

## 2022-02-11 ASSESSMENT — 6 MINUTE WALK TEST (6MWT)
NUMBER OF RESTS DURING INITIAL: 0
TOTAL DISTANCE WALKED (FT): 1161
DID PATIENT PARTICIPATE IN 6 MINUTE WALK TEST: YES

## 2022-02-11 ASSESSMENT — SLEEP AND FATIGUE QUESTIONNAIRES
DO YOU TAKE MEDICATIONS FOR SLEEP: NO
WHERE DO YOU SLEEP: BED
SLEEP QUALITY: VERY GOOD
SLEEP EQUIPMENT AT HOME: OXYGEN

## 2022-02-15 ENCOUNTER — APPOINTMENT (OUTPATIENT)
Dept: PULMONOLOGY | Age: 67
End: 2022-02-15
Attending: INTERNAL MEDICINE

## 2022-02-17 ENCOUNTER — APPOINTMENT (OUTPATIENT)
Dept: CARDIOLOGY | Age: 67
End: 2022-02-17

## 2022-02-22 ENCOUNTER — HOSPITAL ENCOUNTER (OUTPATIENT)
Dept: PULMONOLOGY | Age: 67
Discharge: STILL ADMITTED OR ADMINISTRATIVE | End: 2022-02-22
Attending: INTERNAL MEDICINE

## 2022-02-22 DIAGNOSIS — J44.9 STAGE 2 MODERATE COPD BY GOLD CLASSIFICATION (CMD): ICD-10-CM

## 2022-02-22 PROCEDURE — 94625 PHY/QHP OP PULM RHB W/O MNTR: CPT

## 2022-02-23 ENCOUNTER — TELEPHONE (OUTPATIENT)
Dept: CARDIOLOGY | Age: 67
End: 2022-02-23

## 2022-02-25 ENCOUNTER — HOSPITAL ENCOUNTER (OUTPATIENT)
Dept: PULMONOLOGY | Age: 67
Discharge: STILL ADMITTED OR ADMINISTRATIVE | End: 2022-02-25
Attending: INTERNAL MEDICINE

## 2022-02-25 DIAGNOSIS — J44.9 STAGE 2 MODERATE COPD BY GOLD CLASSIFICATION (CMD): ICD-10-CM

## 2022-02-25 PROCEDURE — 94625 PHY/QHP OP PULM RHB W/O MNTR: CPT

## 2022-03-01 ENCOUNTER — HOSPITAL ENCOUNTER (OUTPATIENT)
Dept: PULMONOLOGY | Age: 67
Discharge: STILL ADMITTED OR ADMINISTRATIVE | End: 2022-03-01
Attending: INTERNAL MEDICINE

## 2022-03-01 DIAGNOSIS — J44.9 STAGE 2 MODERATE COPD BY GOLD CLASSIFICATION (CMD): ICD-10-CM

## 2022-03-01 PROCEDURE — 94625 PHY/QHP OP PULM RHB W/O MNTR: CPT

## 2022-03-04 ENCOUNTER — APPOINTMENT (OUTPATIENT)
Dept: PULMONOLOGY | Age: 67
End: 2022-03-04
Attending: INTERNAL MEDICINE

## 2022-03-08 ENCOUNTER — HOSPITAL ENCOUNTER (OUTPATIENT)
Dept: PULMONOLOGY | Age: 67
Discharge: STILL ADMITTED OR ADMINISTRATIVE | End: 2022-03-08
Attending: INTERNAL MEDICINE

## 2022-03-08 DIAGNOSIS — J44.9 STAGE 2 MODERATE COPD BY GOLD CLASSIFICATION (CMD): ICD-10-CM

## 2022-03-08 PROCEDURE — 94625 PHY/QHP OP PULM RHB W/O MNTR: CPT

## 2022-03-11 ENCOUNTER — HOSPITAL ENCOUNTER (OUTPATIENT)
Dept: PULMONOLOGY | Age: 67
Discharge: STILL ADMITTED OR ADMINISTRATIVE | End: 2022-03-11
Attending: INTERNAL MEDICINE

## 2022-03-11 DIAGNOSIS — J44.9 STAGE 2 MODERATE COPD BY GOLD CLASSIFICATION (CMD): ICD-10-CM

## 2022-03-11 PROCEDURE — 94625 PHY/QHP OP PULM RHB W/O MNTR: CPT

## 2022-03-11 ASSESSMENT — PATIENT HEALTH QUESTIONNAIRE - PHQ9
7. TROUBLE CONCENTRATING ON THINGS, SUCH AS READING THE NEWSPAPER OR WATCHING TELEVISION: NEARLY EVERY DAY
9. THOUGHTS THAT YOU WOULD BE BETTER OFF DEAD, OR OF HURTING YOURSELF: NOT AT ALL
6. FEELING BAD ABOUT YOURSELF - OR THAT YOU ARE A FAILURE OR HAVE LET YOURSELF OR YOUR FAMILY DOWN: NOT AT ALL
10. IF YOU CHECKED OFF ANY PROBLEMS, HOW DIFFICULT HAVE THESE PROBLEMS MADE IT FOR YOU TO DO YOUR WORK, TAKE CARE OF THINGS AT HOME, OR GET ALONG WITH OTHER PEOPLE: SOMEWHAT DIFFICULT
SUM OF ALL RESPONSES TO PHQ QUESTIONS 1-9: 6
3. TROUBLE FALLING OR STAYING ASLEEP OR SLEEPING TOO MUCH: NOT AT ALL
8. MOVING OR SPEAKING SO SLOWLY THAT OTHER PEOPLE COULD HAVE NOTICED. OR THE OPPOSITE, BEING SO FIGETY OR RESTLESS THAT YOU HAVE BEEN MOVING AROUND A LOT MORE THAN USUAL: NOT AT ALL
4. FEELING TIRED OR HAVING LITTLE ENERGY: NOT AT ALL
5. POOR APPETITE OR OVEREATING: NEARLY EVERY DAY
1. LITTLE INTEREST OR PLEASURE IN DOING THINGS: NOT AT ALL
2. FEELING DOWN, DEPRESSED OR HOPELESS: NOT AT ALL

## 2022-03-11 ASSESSMENT — 6 MINUTE WALK TEST (6MWT)
NUMBER OF RESTS DURING INITIAL: 0
TOTAL DISTANCE WALKED (FT): 1161

## 2022-03-11 ASSESSMENT — SLEEP AND FATIGUE QUESTIONNAIRES
DO YOU TAKE MEDICATIONS FOR SLEEP: NO
WHERE DO YOU SLEEP: BED
SLEEP EQUIPMENT AT HOME: OXYGEN
SLEEP QUALITY: VERY GOOD

## 2022-03-11 ASSESSMENT — LIFESTYLE VARIABLES
PACKS_PER_DAY: 0.5
SMOKING_YEARS: 53
SMOKING_TYPE: CIGARETTES

## 2022-03-11 ASSESSMENT — PULMONARY FUNCTION TESTS
FVC: 64
FEV1: 55
FEV1/FVC: 66

## 2022-03-15 ENCOUNTER — HOSPITAL ENCOUNTER (OUTPATIENT)
Dept: PULMONOLOGY | Age: 67
Discharge: STILL ADMITTED OR ADMINISTRATIVE | End: 2022-03-15
Attending: INTERNAL MEDICINE

## 2022-03-15 DIAGNOSIS — J44.9 STAGE 2 MODERATE COPD BY GOLD CLASSIFICATION (CMD): ICD-10-CM

## 2022-03-15 PROCEDURE — 94625 PHY/QHP OP PULM RHB W/O MNTR: CPT

## 2022-03-18 ENCOUNTER — HOSPITAL ENCOUNTER (OUTPATIENT)
Dept: PULMONOLOGY | Age: 67
Discharge: STILL ADMITTED OR ADMINISTRATIVE | End: 2022-03-18
Attending: INTERNAL MEDICINE

## 2022-03-18 DIAGNOSIS — J44.9 STAGE 2 MODERATE COPD BY GOLD CLASSIFICATION (CMD): ICD-10-CM

## 2022-03-18 PROCEDURE — 94625 PHY/QHP OP PULM RHB W/O MNTR: CPT

## 2022-03-22 ENCOUNTER — HOSPITAL ENCOUNTER (OUTPATIENT)
Dept: PULMONOLOGY | Age: 67
Discharge: STILL ADMITTED OR ADMINISTRATIVE | End: 2022-03-22
Attending: INTERNAL MEDICINE

## 2022-03-22 DIAGNOSIS — J44.9 STAGE 2 MODERATE COPD BY GOLD CLASSIFICATION (CMD): ICD-10-CM

## 2022-03-22 PROCEDURE — 94625 PHY/QHP OP PULM RHB W/O MNTR: CPT

## 2022-03-25 ENCOUNTER — HOSPITAL ENCOUNTER (OUTPATIENT)
Dept: PULMONOLOGY | Age: 67
Discharge: STILL ADMITTED OR ADMINISTRATIVE | End: 2022-03-25
Attending: INTERNAL MEDICINE

## 2022-03-25 DIAGNOSIS — J44.9 STAGE 2 MODERATE COPD BY GOLD CLASSIFICATION (CMD): ICD-10-CM

## 2022-03-25 PROCEDURE — 94625 PHY/QHP OP PULM RHB W/O MNTR: CPT

## 2022-03-29 ENCOUNTER — HOSPITAL ENCOUNTER (OUTPATIENT)
Dept: PULMONOLOGY | Age: 67
Discharge: STILL ADMITTED OR ADMINISTRATIVE | End: 2022-03-29
Attending: INTERNAL MEDICINE

## 2022-03-29 DIAGNOSIS — J44.9 STAGE 2 MODERATE COPD BY GOLD CLASSIFICATION (CMD): ICD-10-CM

## 2022-03-29 PROCEDURE — 94625 PHY/QHP OP PULM RHB W/O MNTR: CPT

## 2022-03-31 ENCOUNTER — TELEPHONE (OUTPATIENT)
Dept: CARDIOLOGY | Age: 67
End: 2022-03-31

## 2022-04-01 ENCOUNTER — APPOINTMENT (OUTPATIENT)
Dept: PULMONOLOGY | Age: 67
End: 2022-04-01
Attending: INTERNAL MEDICINE

## 2022-04-01 ENCOUNTER — TELEPHONE (OUTPATIENT)
Dept: CARDIOLOGY | Age: 67
End: 2022-04-01

## 2022-04-05 ENCOUNTER — APPOINTMENT (OUTPATIENT)
Dept: PULMONOLOGY | Age: 67
End: 2022-04-05
Attending: INTERNAL MEDICINE

## 2022-04-08 ENCOUNTER — APPOINTMENT (OUTPATIENT)
Dept: PULMONOLOGY | Age: 67
End: 2022-04-08
Attending: INTERNAL MEDICINE

## 2022-04-11 RX ORDER — ATORVASTATIN CALCIUM 80 MG/1
TABLET, FILM COATED ORAL
Qty: 90 TABLET | Refills: 3 | Status: SHIPPED | OUTPATIENT
Start: 2022-04-11 | End: 2022-05-24 | Stop reason: SDUPTHER

## 2022-04-12 ENCOUNTER — APPOINTMENT (OUTPATIENT)
Dept: PULMONOLOGY | Age: 67
End: 2022-04-12
Attending: INTERNAL MEDICINE

## 2022-05-25 RX ORDER — ATORVASTATIN CALCIUM 80 MG/1
TABLET, FILM COATED ORAL
Qty: 90 TABLET | Refills: 1 | Status: SHIPPED | OUTPATIENT
Start: 2022-05-25 | End: 2023-02-08 | Stop reason: SDUPTHER

## 2022-10-13 ENCOUNTER — APPOINTMENT (OUTPATIENT)
Dept: CARDIOLOGY | Age: 67
End: 2022-10-13
Attending: INTERNAL MEDICINE

## 2022-12-07 ENCOUNTER — TELEPHONE (OUTPATIENT)
Dept: CARDIOLOGY | Age: 67
End: 2022-12-07

## 2022-12-07 DIAGNOSIS — E78.00 PURE HYPERCHOLESTEROLEMIA: ICD-10-CM

## 2022-12-07 DIAGNOSIS — R53.83 MALAISE AND FATIGUE: ICD-10-CM

## 2022-12-07 DIAGNOSIS — Z95.5 S/P PRIMARY ANGIOPLASTY WITH CORONARY STENT: Primary | ICD-10-CM

## 2022-12-07 DIAGNOSIS — I25.10 CORONARY ARTERY DISEASE INVOLVING NATIVE CORONARY ARTERY OF NATIVE HEART WITHOUT ANGINA PECTORIS: ICD-10-CM

## 2022-12-07 DIAGNOSIS — R53.81 MALAISE AND FATIGUE: ICD-10-CM

## 2022-12-07 DIAGNOSIS — I65.23 BILATERAL CAROTID ARTERY STENOSIS: ICD-10-CM

## 2022-12-07 DIAGNOSIS — I65.21 STENOSIS OF RIGHT CAROTID ARTERY: ICD-10-CM

## 2022-12-07 DIAGNOSIS — I10 HYPERTENSION, BENIGN: ICD-10-CM

## 2023-02-07 ASSESSMENT — PATIENT HEALTH QUESTIONNAIRE - PHQ9
SUM OF ALL RESPONSES TO PHQ9 QUESTIONS 1 AND 2: 0
SUM OF ALL RESPONSES TO PHQ9 QUESTIONS 1 AND 2: 0
CLINICAL INTERPRETATION OF PHQ2 SCORE: NO FURTHER SCREENING NEEDED
1. LITTLE INTEREST OR PLEASURE IN DOING THINGS: NOT AT ALL
2. FEELING DOWN, DEPRESSED OR HOPELESS: NOT AT ALL

## 2023-02-08 ENCOUNTER — OFFICE VISIT (OUTPATIENT)
Dept: CARDIOLOGY | Age: 68
End: 2023-02-08

## 2023-02-08 VITALS
WEIGHT: 229.28 LBS | SYSTOLIC BLOOD PRESSURE: 114 MMHG | DIASTOLIC BLOOD PRESSURE: 62 MMHG | RESPIRATION RATE: 16 BRPM | HEART RATE: 62 BPM | HEIGHT: 68 IN | BODY MASS INDEX: 34.75 KG/M2

## 2023-02-08 DIAGNOSIS — R07.9 CHEST PAIN, UNSPECIFIED TYPE: ICD-10-CM

## 2023-02-08 DIAGNOSIS — I10 HYPERTENSION, BENIGN: ICD-10-CM

## 2023-02-08 DIAGNOSIS — E78.00 PURE HYPERCHOLESTEROLEMIA: ICD-10-CM

## 2023-02-08 DIAGNOSIS — I25.10 CORONARY ARTERY DISEASE INVOLVING NATIVE CORONARY ARTERY OF NATIVE HEART WITHOUT ANGINA PECTORIS: ICD-10-CM

## 2023-02-08 DIAGNOSIS — Z95.5 S/P PRIMARY ANGIOPLASTY WITH CORONARY STENT: Primary | ICD-10-CM

## 2023-02-08 DIAGNOSIS — I65.23 BILATERAL CAROTID ARTERY STENOSIS: ICD-10-CM

## 2023-02-08 PROCEDURE — 99215 OFFICE O/P EST HI 40 MIN: CPT | Performed by: INTERNAL MEDICINE

## 2023-02-08 RX ORDER — CLOPIDOGREL BISULFATE 75 MG/1
75 TABLET ORAL DAILY
Qty: 90 TABLET | Refills: 2 | Status: SHIPPED | OUTPATIENT
Start: 2023-02-08

## 2023-02-08 RX ORDER — ATORVASTATIN CALCIUM 80 MG/1
TABLET, FILM COATED ORAL
Qty: 90 TABLET | Refills: 2 | Status: SHIPPED | OUTPATIENT
Start: 2023-02-08

## 2023-02-08 RX ORDER — LISINOPRIL 10 MG/1
10 TABLET ORAL DAILY
Qty: 90 TABLET | Refills: 2 | Status: SHIPPED | OUTPATIENT
Start: 2023-02-08 | End: 2023-09-29 | Stop reason: SDUPTHER

## 2023-02-08 SDOH — HEALTH STABILITY: PHYSICAL HEALTH: ON AVERAGE, HOW MANY DAYS PER WEEK DO YOU ENGAGE IN MODERATE TO STRENUOUS EXERCISE (LIKE A BRISK WALK)?: 0 DAYS

## 2023-02-08 SDOH — HEALTH STABILITY: PHYSICAL HEALTH: ON AVERAGE, HOW MANY MINUTES DO YOU ENGAGE IN EXERCISE AT THIS LEVEL?: 0 MIN

## 2023-02-23 ENCOUNTER — ANCILLARY PROCEDURE (OUTPATIENT)
Dept: CARDIOLOGY | Age: 68
End: 2023-02-23
Attending: INTERNAL MEDICINE

## 2023-02-23 VITALS — HEIGHT: 69 IN | WEIGHT: 229 LBS | BODY MASS INDEX: 33.92 KG/M2

## 2023-02-23 DIAGNOSIS — E78.00 PURE HYPERCHOLESTEROLEMIA: ICD-10-CM

## 2023-02-23 DIAGNOSIS — I25.10 CORONARY ARTERY DISEASE INVOLVING NATIVE CORONARY ARTERY OF NATIVE HEART WITHOUT ANGINA PECTORIS: ICD-10-CM

## 2023-02-23 DIAGNOSIS — Z95.5 S/P PRIMARY ANGIOPLASTY WITH CORONARY STENT: ICD-10-CM

## 2023-02-23 DIAGNOSIS — I65.23 BILATERAL CAROTID ARTERY STENOSIS: ICD-10-CM

## 2023-02-23 DIAGNOSIS — R07.9 CHEST PAIN, UNSPECIFIED TYPE: ICD-10-CM

## 2023-02-23 PROCEDURE — 93880 EXTRACRANIAL BILAT STUDY: CPT | Performed by: INTERNAL MEDICINE

## 2023-02-23 PROCEDURE — 78452 HT MUSCLE IMAGE SPECT MULT: CPT | Performed by: INTERNAL MEDICINE

## 2023-02-23 PROCEDURE — 93015 CV STRESS TEST SUPVJ I&R: CPT | Performed by: INTERNAL MEDICINE

## 2023-02-23 PROCEDURE — A9502 TC99M TETROFOSMIN: HCPCS | Performed by: INTERNAL MEDICINE

## 2023-02-23 RX ORDER — REGADENOSON 0.08 MG/ML
0.4 INJECTION, SOLUTION INTRAVENOUS ONCE
Status: COMPLETED | OUTPATIENT
Start: 2023-02-23 | End: 2023-02-23

## 2023-02-23 RX ADMIN — REGADENOSON 0.4 MG: 0.08 INJECTION, SOLUTION INTRAVENOUS at 10:35

## 2023-02-23 ASSESSMENT — EXERCISE STRESS TEST
STAGE_CATEGORIES: RESTING
PEAK_BP: 120/66
PEAK_HR: 50
PEAK_BP: 120/70
STOPPAGE_REASON: PROTOCOL COMPLETE
PEAK_BP: 110/68
STAGE_CATEGORIES: RECOVERY 0
PEAK_HR: 64
PEAK_BP: 120/60
PEAK_RPP: 5400
PEAK_RPP: 6000
STAGE_CATEGORIES: RECOVERY 2
STAGE_CATEGORIES: RECOVERY 1
PEAK_BP: 136/70
PEAK_HR: 73
PEAK_RPP: 7040
COMMENTS: REGA INJECT
STAGE_CATEGORIES: 1
PEAK_RPP: 8760
PEAK_HR: 45
PEAK_RPP: 6528
PEAK_HR: 48

## 2023-02-24 LAB
HEART RATE RESERVE PREDICTED: 70.2 BPM
LV EF: 62 %
RESTING HR ACHIEVED: 48 BPM
STRESS BASELINE BP: NORMAL MMHG
STRESS PEAK HR: 45 BPM
STRESS PERCENT HR: 30 %
STRESS POST EXERCISE DUR MIN: 1 MIN
STRESS POST PEAK BP: NORMAL MMHG
STRESS TARGET HR: 151 BPM

## 2023-03-23 ASSESSMENT — PATIENT HEALTH QUESTIONNAIRE - PHQ9
1. LITTLE INTEREST OR PLEASURE IN DOING THINGS: NOT AT ALL
SUM OF ALL RESPONSES TO PHQ9 QUESTIONS 1 AND 2: 0
SUM OF ALL RESPONSES TO PHQ9 QUESTIONS 1 AND 2: 0
CLINICAL INTERPRETATION OF PHQ2 SCORE: NO FURTHER SCREENING NEEDED
2. FEELING DOWN, DEPRESSED OR HOPELESS: NOT AT ALL

## 2023-03-24 ENCOUNTER — OFFICE VISIT (OUTPATIENT)
Dept: CARDIOLOGY | Age: 68
End: 2023-03-24

## 2023-03-24 VITALS
BODY MASS INDEX: 33.31 KG/M2 | HEART RATE: 57 BPM | SYSTOLIC BLOOD PRESSURE: 118 MMHG | HEIGHT: 69 IN | RESPIRATION RATE: 16 BRPM | WEIGHT: 224.87 LBS | DIASTOLIC BLOOD PRESSURE: 56 MMHG

## 2023-03-24 DIAGNOSIS — I65.23 BILATERAL CAROTID ARTERY STENOSIS: ICD-10-CM

## 2023-03-24 DIAGNOSIS — E78.00 PURE HYPERCHOLESTEROLEMIA: ICD-10-CM

## 2023-03-24 DIAGNOSIS — I25.10 CORONARY ARTERY DISEASE INVOLVING NATIVE CORONARY ARTERY OF NATIVE HEART WITHOUT ANGINA PECTORIS: ICD-10-CM

## 2023-03-24 DIAGNOSIS — I10 HYPERTENSION, BENIGN: ICD-10-CM

## 2023-03-24 DIAGNOSIS — Z95.5 S/P PRIMARY ANGIOPLASTY WITH CORONARY STENT: Primary | ICD-10-CM

## 2023-03-24 PROCEDURE — 99215 OFFICE O/P EST HI 40 MIN: CPT | Performed by: INTERNAL MEDICINE

## 2023-03-24 SDOH — HEALTH STABILITY: PHYSICAL HEALTH: ON AVERAGE, HOW MANY DAYS PER WEEK DO YOU ENGAGE IN MODERATE TO STRENUOUS EXERCISE (LIKE A BRISK WALK)?: 0 DAYS

## 2023-03-24 SDOH — HEALTH STABILITY: PHYSICAL HEALTH: ON AVERAGE, HOW MANY MINUTES DO YOU ENGAGE IN EXERCISE AT THIS LEVEL?: 0 MIN

## 2023-09-29 ENCOUNTER — OFFICE VISIT (OUTPATIENT)
Dept: CARDIOLOGY | Age: 68
End: 2023-09-29

## 2023-09-29 VITALS
HEIGHT: 69 IN | SYSTOLIC BLOOD PRESSURE: 107 MMHG | WEIGHT: 225.53 LBS | DIASTOLIC BLOOD PRESSURE: 63 MMHG | BODY MASS INDEX: 33.4 KG/M2 | HEART RATE: 54 BPM

## 2023-09-29 DIAGNOSIS — I10 HYPERTENSION, BENIGN: ICD-10-CM

## 2023-09-29 DIAGNOSIS — Z95.5 S/P PRIMARY ANGIOPLASTY WITH CORONARY STENT: Primary | ICD-10-CM

## 2023-09-29 DIAGNOSIS — I65.21 STENOSIS OF RIGHT CAROTID ARTERY: ICD-10-CM

## 2023-09-29 DIAGNOSIS — E78.00 PURE HYPERCHOLESTEROLEMIA: ICD-10-CM

## 2023-09-29 DIAGNOSIS — I65.23 BILATERAL CAROTID ARTERY STENOSIS: ICD-10-CM

## 2023-09-29 DIAGNOSIS — I25.10 CORONARY ARTERY DISEASE INVOLVING NATIVE CORONARY ARTERY OF NATIVE HEART WITHOUT ANGINA PECTORIS: ICD-10-CM

## 2023-09-29 PROCEDURE — 99214 OFFICE O/P EST MOD 30 MIN: CPT | Performed by: INTERNAL MEDICINE

## 2023-09-29 RX ORDER — LISINOPRIL 10 MG/1
10 TABLET ORAL DAILY
Qty: 90 TABLET | Refills: 3 | Status: SHIPPED | OUTPATIENT
Start: 2023-09-29

## 2023-11-03 ENCOUNTER — EXTERNAL LAB (OUTPATIENT)
Dept: OTHER | Age: 68
End: 2023-11-03

## 2023-11-03 LAB
ALBUMIN SERPL-MCNC: 4 G/DL (ref 3.5–5.2)
ALP SERPL-CCNC: 83 U/L (ref 55–142)
ALT SERPL-CCNC: 13 U/L (ref 0–33)
AST SERPL-CCNC: 13 U/L (ref 0–32)
BILIRUB SERPL-MCNC: 0.48 MG/DL (ref 0–1.2)
BUN SERPL-MCNC: 8 MG/DL (ref 6–20)
BUN/CREAT SERPL: 9 (ref 10–20)
CALCIUM SERPL-MCNC: 10.7 MG/DL (ref 8.6–10.3)
CHLORIDE SERPL-SCNC: 101 MMOL/L (ref 98–107)
CHOLEST SERPL-MCNC: 110 MG/DL
CHOLEST/HDLC SERPL: 3 RATIO (ref 0–4.5)
CO2 SERPL-SCNC: 28.7 MMOL/L (ref 22–29)
CREAT SERPL-MCNC: 0.94 MG/DL (ref 0.5–0.9)
ERYTHROCYTE [DISTWIDTH] IN BLOOD: 14.4 % (ref 11.5–16)
EST. AVERAGE GLUCOSE BLD GHB EST-MCNC: 117 MG/DL
GFR SERPLBLD SCHWARTZ-ARVRAT: 61.66 ML/MIN/1.73 M2
GLUCOSE SERPL-MCNC: 94 MG/DL (ref 74–109)
HBA1C MFR BLD: 5.7 % (ref 4–5.6)
HCT VFR BLD CALC: 49.4 % (ref 34–50)
HDLC SERPL-MCNC: 39 MG/DL
HGB BLD-MCNC: 15.9 G/DL (ref 12–16)
LDLC SERPL CALC-MCNC: 49 MG/DL
LENGTH OF FAST TIME PATIENT: YES H
LENGTH OF FAST TIME PATIENT: YES H
MCH RBC QN AUTO: 31.1 PG (ref 27–33.2)
MCHC RBC AUTO-ENTMCNC: 32.2 G/DL (ref 31–37)
MCV RBC AUTO: 96.5 FL (ref 81–100)
MPV (OFFPRE2): 11.2 FL (ref 7–11.5)
PLATELET # BLD: 312 10^3/UL (ref 150–450)
POTASSIUM SERPL-SCNC: 4.9 MMOL/L (ref 3.5–5.2)
PROT SERPL-MCNC: 6.7 G/DL (ref 6.4–8.3)
RBC # BLD: 5.12 10^6/UL (ref 3.8–5.1)
SODIUM SERPL-SCNC: 139 MMOL/L (ref 136–145)
TRIGL SERPL-MCNC: 110 MG/DL
TSH SERPL-ACNC: 1.27 MIU/L (ref 0.27–4.2)
VLDLC SERPL CALC-MCNC: 22 MG/DL
WBC # BLD: 9.09 10^3/UL (ref 4–13)

## 2023-11-08 ENCOUNTER — TELEPHONE (OUTPATIENT)
Dept: CARDIOLOGY | Age: 68
End: 2023-11-08

## 2023-11-25 DIAGNOSIS — I65.23 BILATERAL CAROTID ARTERY STENOSIS: ICD-10-CM

## 2023-11-28 RX ORDER — ATORVASTATIN CALCIUM 80 MG/1
TABLET, FILM COATED ORAL
Qty: 90 TABLET | Refills: 3 | Status: SHIPPED | OUTPATIENT
Start: 2023-11-28

## 2023-11-28 RX ORDER — CLOPIDOGREL BISULFATE 75 MG/1
75 TABLET ORAL DAILY
Qty: 90 TABLET | Refills: 3 | Status: SHIPPED | OUTPATIENT
Start: 2023-11-28

## 2024-06-18 ENCOUNTER — APPOINTMENT (OUTPATIENT)
Dept: CARDIOLOGY | Age: 69
End: 2024-06-18
Attending: INTERNAL MEDICINE

## 2024-06-26 ENCOUNTER — ANCILLARY PROCEDURE (OUTPATIENT)
Dept: CARDIOLOGY | Age: 69
End: 2024-06-26
Attending: INTERNAL MEDICINE

## 2024-06-26 DIAGNOSIS — Z95.5 S/P PRIMARY ANGIOPLASTY WITH CORONARY STENT: ICD-10-CM

## 2024-06-26 DIAGNOSIS — I65.23 BILATERAL CAROTID ARTERY STENOSIS: ICD-10-CM

## 2024-06-26 PROCEDURE — 93880 EXTRACRANIAL BILAT STUDY: CPT | Performed by: INTERNAL MEDICINE

## 2024-06-27 ENCOUNTER — TELEPHONE (OUTPATIENT)
Dept: CARDIOLOGY | Age: 69
End: 2024-06-27

## 2024-10-11 ENCOUNTER — APPOINTMENT (OUTPATIENT)
Dept: CARDIOLOGY | Age: 69
End: 2024-10-11

## 2024-10-14 ENCOUNTER — APPOINTMENT (OUTPATIENT)
Dept: CARDIOLOGY | Age: 69
End: 2024-10-14

## 2024-10-14 ENCOUNTER — LAB SERVICES (OUTPATIENT)
Dept: LAB | Age: 69
End: 2024-10-14

## 2024-10-14 VITALS
WEIGHT: 217.59 LBS | BODY MASS INDEX: 32.23 KG/M2 | DIASTOLIC BLOOD PRESSURE: 71 MMHG | OXYGEN SATURATION: 93 % | HEART RATE: 75 BPM | HEIGHT: 69 IN | SYSTOLIC BLOOD PRESSURE: 132 MMHG

## 2024-10-14 DIAGNOSIS — I25.10 CORONARY ARTERY DISEASE INVOLVING NATIVE CORONARY ARTERY OF NATIVE HEART WITHOUT ANGINA PECTORIS: ICD-10-CM

## 2024-10-14 DIAGNOSIS — I65.23 BILATERAL CAROTID ARTERY STENOSIS: Primary | ICD-10-CM

## 2024-10-14 DIAGNOSIS — E78.00 PURE HYPERCHOLESTEROLEMIA: ICD-10-CM

## 2024-10-14 DIAGNOSIS — I65.23 BILATERAL CAROTID ARTERY STENOSIS: ICD-10-CM

## 2024-10-14 DIAGNOSIS — I10 HYPERTENSION, BENIGN: ICD-10-CM

## 2024-10-14 DIAGNOSIS — Z95.5 S/P PRIMARY ANGIOPLASTY WITH CORONARY STENT: ICD-10-CM

## 2024-10-14 LAB
ALT SERPL-CCNC: 23 UNITS/L
AST SERPL-CCNC: 13 UNITS/L
CHOLEST SERPL-MCNC: 133 MG/DL
CHOLEST/HDLC SERPL: 3.2 {RATIO}
HDLC SERPL-MCNC: 42 MG/DL
LDLC SERPL CALC-MCNC: 64 MG/DL
NONHDLC SERPL-MCNC: 91 MG/DL
TRIGL SERPL-MCNC: 133 MG/DL

## 2024-10-14 PROCEDURE — 80061 LIPID PANEL: CPT | Performed by: CLINICAL MEDICAL LABORATORY

## 2024-10-14 PROCEDURE — 36415 COLL VENOUS BLD VENIPUNCTURE: CPT | Performed by: CLINICAL MEDICAL LABORATORY

## 2024-10-14 PROCEDURE — 84460 ALANINE AMINO (ALT) (SGPT): CPT | Performed by: CLINICAL MEDICAL LABORATORY

## 2024-10-14 PROCEDURE — 84450 TRANSFERASE (AST) (SGOT): CPT | Performed by: CLINICAL MEDICAL LABORATORY

## 2024-10-14 RX ORDER — ATORVASTATIN CALCIUM 80 MG/1
TABLET, FILM COATED ORAL
Qty: 90 TABLET | Refills: 3 | Status: SHIPPED | OUTPATIENT
Start: 2024-10-14

## 2024-10-14 RX ORDER — ASPIRIN 81 MG/1
81 TABLET, CHEWABLE ORAL DAILY
Qty: 90 TABLET | Refills: 3 | Status: SHIPPED | OUTPATIENT
Start: 2024-10-14

## 2024-10-14 RX ORDER — LISINOPRIL 10 MG/1
10 TABLET ORAL DAILY
Qty: 90 TABLET | Refills: 3 | Status: SHIPPED | OUTPATIENT
Start: 2024-10-14

## 2024-10-14 RX ORDER — CLOPIDOGREL BISULFATE 75 MG/1
75 TABLET ORAL DAILY
Qty: 90 TABLET | Refills: 3 | Status: SHIPPED | OUTPATIENT
Start: 2024-10-14

## 2024-10-14 RX ORDER — METOPROLOL TARTRATE 25 MG/1
25 TABLET, FILM COATED ORAL DAILY
Qty: 90 TABLET | Refills: 2 | Status: SHIPPED | OUTPATIENT
Start: 2024-10-14

## 2024-10-14 ASSESSMENT — PATIENT HEALTH QUESTIONNAIRE - PHQ9
2. FEELING DOWN, DEPRESSED OR HOPELESS: NOT AT ALL
SUM OF ALL RESPONSES TO PHQ9 QUESTIONS 1 AND 2: 0
1. LITTLE INTEREST OR PLEASURE IN DOING THINGS: NOT AT ALL
SUM OF ALL RESPONSES TO PHQ9 QUESTIONS 1 AND 2: 0
CLINICAL INTERPRETATION OF PHQ2 SCORE: NO FURTHER SCREENING NEEDED

## 2024-10-23 ENCOUNTER — APPOINTMENT (OUTPATIENT)
Dept: CARDIOLOGY | Age: 69
End: 2024-10-23
Attending: INTERNAL MEDICINE

## 2024-10-25 ENCOUNTER — TELEPHONE (OUTPATIENT)
Dept: CARDIOLOGY | Age: 69
End: 2024-10-25

## 2024-10-29 ENCOUNTER — APPOINTMENT (OUTPATIENT)
Dept: CARDIOLOGY | Age: 69
End: 2024-10-29
Attending: INTERNAL MEDICINE

## 2025-04-17 ENCOUNTER — APPOINTMENT (OUTPATIENT)
Dept: CARDIOLOGY | Age: 70
End: 2025-04-17
Attending: INTERNAL MEDICINE

## (undated) DEVICE — HEMOCLIP HORIZON SM MULTI

## (undated) DEVICE — SUTURE ETHIBOND EXCEL 3-0 SH

## (undated) DEVICE — CV PACK-LF: Brand: MEDLINE INDUSTRIES, INC.

## (undated) DEVICE — STANDARD HYPODERMIC NEEDLE,POLYPROPYLENE HUB: Brand: MONOJECT

## (undated) DEVICE — 3M(TM) TEGADERM(TM) TRANSPARENT FILM DRESSING FRAME STYLE 9505W: Brand: 3M™ TEGADERM™

## (undated) DEVICE — SUTURE VICRYL 3-0 PS-1

## (undated) DEVICE — TRANSPOSAL ULTRAFLEX DUO/QUAD ULTRA CART MANIFOLD

## (undated) DEVICE — TOWEL: OR BLU 80/CS: Brand: MEDICAL ACTION INDUSTRIES

## (undated) DEVICE — GLOVE SURG TRIUMPH SZ 7

## (undated) DEVICE — CHLORAPREP ORANGE TINT 10.5ML

## (undated) DEVICE — UNDYED BRAIDED (POLYGLACTIN 910), SYNTHETIC ABSORBABLE SUTURE: Brand: COATED VICRYL

## (undated) DEVICE — SUTURE VICRYL 3-0 CT-1

## (undated) DEVICE — CATH RED RUBBER 18FR

## (undated) DEVICE — SPECIMEN CONTAINER,POSITIVE SEAL INDICATOR, OR PACKAGED: Brand: PRECISION

## (undated) DEVICE — MEDI-VAC SUCTION FINE CAPACITY: Brand: CARDINAL HEALTH

## (undated) DEVICE — BARD® JAVID™ CAROTID BYPASS SHUNT, 17F - 10F X 27.5CM: Brand: BARD® JAVID

## (undated) DEVICE — SOL  .9 500ML

## (undated) DEVICE — SUTURE PROLENE 6-0 C-1

## (undated) DEVICE — 3M(TM) TEGADERM(TM) TRANSPARENT FILM DRESSING FRAME STYLE 1628: Brand: 3M™ TEGADERM™

## (undated) DEVICE — 3M™ IOBAN™ 2 ANTIMICROBIAL INCISE DRAPE 6650EZ: Brand: IOBAN™ 2

## (undated) DEVICE — DERMABOND LIQUID ADHESIVE

## (undated) DEVICE — BASIC DOUBLE BASIN 1-LF: Brand: MEDLINE INDUSTRIES, INC.

## (undated) DEVICE — HEMOCLIP MED 24 CLIP/CARTRIDGE

## (undated) DEVICE — SOL  .9 1000ML BTL

## (undated) DEVICE — GLOVE SURG TRIUMPH SZ 71/2

## (undated) DEVICE — INTENDED TO BE USED TO OCCLUDE, RETRACT AND IDENTIFY ARTERIES, VEINS, TENDONS AND NERVES IN SURGICAL PROCEDURES: Brand: STERION®  VESSEL LOOP

## (undated) DEVICE — DECANTER BAG 9": Brand: MEDLINE INDUSTRIES, INC.

## (undated) DEVICE — SUTURE POLYDEK 4-0 TIES 6-932

## (undated) DEVICE — 3M™ STERI-STRIP™ REINFORCED ADHESIVE SKIN CLOSURES, R1547, 1/2 IN X 4 IN (12 MM X 100 MM), 6 STRIPS/ENVELOPE: Brand: 3M™ STERI-STRIP™

## (undated) DEVICE — SUTURE PROLENE 7-0 CC

## (undated) DEVICE — 3M™ TEGADERM™ TRANSPARENT FILM DRESSING, 1626W, 4 IN X 4-3/4 IN (10 CM X 12 CM), 50 EACH/CARTON, 4 CARTON/CASE: Brand: 3M™ TEGADERM™

## (undated) DEVICE — GAUZE SPONGES,12 PLY: Brand: CURITY

## (undated) DEVICE — TRAY FOLEY 16F IC URINMETER

## (undated) DEVICE — PROXIMATE RH ROTATING HEAD SKIN STAPLERS (35 WIDE) CONTAINS 35 STAINLESS STEEL STAPLES: Brand: PROXIMATE

## (undated) DEVICE — SUTURE VICRYL 2-0 CT-1

## (undated) DEVICE — SYRINGE 10ML LL TIP

## (undated) NOTE — LETTER
[facility]  [address] [facility], [address] [place] [address]    Consent for Operation    Date: __________________    Time: _______________    1.  I authorize the performance upon Vickie Section the following operation:    Procedure(s):  Right carotid endar procedure has been videotaped, the surgeon will obtain the original videotape. The hospital will not be responsible for storage or maintenance of this tape.     6. For the purpose of advancing medical education, I consent to the admittance of observers to t STATEMENTS REQUIRING INSERTION OR COMPLETION WERE FILLED IN.     Signature of Patient:   ___________________________    When the patient is a minor or mentally incompetent to give consent:  Signature of person authorized to consent for patient: ____________ supplements, and pills I can buy without a prescription (including street drugs/illegal medications). Failure to inform my anesthesiologist about these medicines may increase my risk of anesthetic complications.   · If I am allergic to anything or have had Anesthesiologist Signature     Date   Time  I have discussed the procedure and information above with the patient (or patient’s representative) and answered their questions. The patient or their representative has agreed to have anesthesia services.     ___